# Patient Record
Sex: MALE | Race: BLACK OR AFRICAN AMERICAN | NOT HISPANIC OR LATINO | Employment: OTHER | URBAN - NONMETROPOLITAN AREA
[De-identification: names, ages, dates, MRNs, and addresses within clinical notes are randomized per-mention and may not be internally consistent; named-entity substitution may affect disease eponyms.]

---

## 2019-07-19 ENCOUNTER — TRANSCRIBE ORDERS (OUTPATIENT)
Dept: PHYSICAL THERAPY | Facility: HOSPITAL | Age: 64
End: 2019-07-19

## 2019-07-19 DIAGNOSIS — M19.011 OSTEOARTHRITIS OF RIGHT AC (ACROMIOCLAVICULAR) JOINT: ICD-10-CM

## 2019-07-19 DIAGNOSIS — M75.111 NONTRAUMATIC INCOMPLETE TEAR OF RIGHT ROTATOR CUFF: Primary | ICD-10-CM

## 2019-07-24 ENCOUNTER — HOSPITAL ENCOUNTER (OUTPATIENT)
Dept: PHYSICAL THERAPY | Facility: HOSPITAL | Age: 64
Setting detail: THERAPIES SERIES
Discharge: HOME | End: 2019-07-24

## 2019-07-24 DIAGNOSIS — M75.111 NONTRAUMATIC INCOMPLETE TEAR OF RIGHT ROTATOR CUFF: Primary | ICD-10-CM

## 2019-07-24 DIAGNOSIS — Z98.890 STATUS POST RIGHT ROTATOR CUFF REPAIR: ICD-10-CM

## 2019-07-24 PROCEDURE — 97161 PT EVAL LOW COMPLEX 20 MIN: CPT | Performed by: PHYSICAL THERAPIST

## 2019-07-24 PROCEDURE — 97110 THERAPEUTIC EXERCISES: CPT | Performed by: PHYSICAL THERAPIST

## 2019-07-24 NOTE — THERAPY EVALUATION
Outpatient Physical Therapy Ortho Initial Evaluation  [facility]     Patient Name: Jeramy South  : 1955  MRN: [id number]  Today's Date: 2019      Visit Date: 2019 ( medicare)  MD 19,   Recert 19    Subjective    There is no problem list on file for this patient.       No past medical history on file.  borderline HTN, diabetes,     No past surgical history on file. 3 lumbar surgeries, Previous R SAD and RTC repair, Dr Saleem,Left ankle fusion, Marco bunionectom  y, Thyroid surgery, bilateral CTR,     Medication: pt will bring list    ALLERGIES: NKDA  Visit Dx:     ICD-10-CM ICD-9-CM   1. Nontraumatic incomplete tear of right rotator cuff M75.111 726.13   2. Status post right rotator cuff repair Z98.890 V45.89       Objective    PT Ortho     Row Name 19 1500       Special Tests/Palpation    Special Tests/Palpation  -- Staples intact no signs of redness, inflammation in incision  -DD       General ROM    GENERAL ROM COMMENTS  Passive range of motion flexion 130 degrees, abduction 110 degrees, external rotation at 45 degrees abduction 30 degrees, internal rotation 50 degrees same position.  -DD       MMT (Manual Muscle Testing)    General MMT Comments  deferred post  op  -DD       Sensation    Sensation WNL?  WNL  -DD    Light Touch  No apparent deficits  -DD      User Key  (r) = Recorded By, (t) = Taken By, (c) = Cosigned By    Initials Name Provider Type    DD Meera Daniel, PT DPT Physical Therapist        Therapy Education  Given: HEP  Program: New  How Provided: Verbal, Written  Provided to: Patient  Level of Understanding: Verbalized, Demonstrated    Assessment/Plan     PT OP Goals     Row Name 19 1500          PT Short Term Goals    STG Date to Achieve  19  -DD     STG 1  Patient independent in home exercise program  -DD     STG 2   patient have passive shoulder flexion 165 degrees  -DD     STG 3  Patient have shoulder passive range of motion  abduction 160 degrees  -DD     STG 4  Patient have external rotation at 45 degrees abduction to 55 degrees or better  -DD     STG 5  Patient able to progress to active assistive program at 6 weeks  -DD     STG 6  Patient had a quick-score of 70% or less  -DD        Long Term Goals    LTG 1  Patient have a quick-score of less than 30%  -DD     LTG 2  Patient had manual muscle testing 5/5 shoulder flexion  -DD     LTG 3  Patient have shoulder abduction 4+ MMT  -DD     LTG 4  Patient have active range of motion 160 degrees flexion  -DD     LTG 5  Patient have active shoulder flexion 160 degrees  -DD     LTG 6  Patient have active internal rotation reach equal to the left  -DD     LTG 7  Patient have active external rotation at 90 degrees abduction to 80 degrees or better  -DD        Time Calculation    PT Goal Re-Cert Due Date  08/14/19  -DD       User Key  (r) = Recorded By, (t) = Taken By, (c) = Cosigned By    Initials Name Provider Type    DD Meera Daniel, PT DPT Physical Therapist          PT Assessment/Plan     Row Name 07/24/19 1506          PT Assessment    Functional Limitations  Limitation in home management;Limitations in community activities;Performance in leisure activities  -DD     Impairments  Muscle strength;Pain;Range of motion  -DD     Assessment Comments  Patient is 8 days postop rotator cuff repair on the right.  Will need guidance through protocol for progression of passive active assistive and strengthening phases return to prior level of function  -DD     Please refer to paper survey for additional self-reported information  Yes  -DD     Rehab Potential  Good  -DD     Patient/caregiver participated in establishment of treatment plan and goals  Yes  -DD     Patient would benefit from skilled therapy intervention  Yes  -DD        PT Plan    PT Frequency  2x/week  -DD     Predicted Duration of Therapy Intervention (Therapy Eval)  12 weeks  -DD     Planned CPT's?  PT THER PROC EA 15 MIN:  20027;PT EVAL LOW COMPLEXITY: 06292;PT ELECTRICAL STIM UNATTEND: ;PT HOT OR COLD PACK TREAT MCARE  -DD     Physical Therapy Interventions (Optional Details)  ROM (Range of Motion);home exercise program  -DD     PT Plan Comments  Passive range of motion only at this time.  MD note for patient 7/31 MD visit.  Will ask for protocol or guidance for progression.  May e-stim and ice as needed.  -DD       User Key  (r) = Recorded By, (t) = Taken By, (c) = Cosigned By    Initials Name Provider Type    Meera Abrams, PT DPT Physical Therapist            Exercises     Row Name 07/24/19 1500             Exercise 1    Exercise Name 1  Passive range of motion  -DD      Time 1  20  -DD         Exercise 2    Exercise Name 2  Codman pendulums  -DD      Reps 2  25 each  -DD         Exercise 3    Exercise Name 3  Scapular squeezes  -DD      Reps 3  20  -DD        User Key  (r) = Recorded By, (t) = Taken By, (c) = Cosigned By    Initials Name Provider Type    Meera Abrams, PT DPT Physical Therapist           Outcome Measure Options: Quick DASH  Quick DASH  Open a tight or new jar.: Unable  Do heavy household chores (e.g., wash walls, wash floors): Unable  Carry a shopping bag or briefcase: Unable  Wash your back: Unable  Use a knife to cut food: Unable  Recreational activities in which you take some force or impact through your arm, should or hand (e.g. golf, hammering, tennis, etc.): Unable  During the past week, to what extent has your arm, shoulder, or hand problem interfered with your normal social activites with family, friends, neighbors or groups?: Extremely  During the past week, were you limited in your work or other regular daily activities as a result of your arm, shoulder or hand problem?: Unable  Arm, Shoulder, or hand pain: Severe  Tingling (pins and needles) in your arm, shoulder, or hand: Severe  During the past week, how much difficulty have you had sleeping because of the pain in your  arm, shoulder or hand?: Severe Difficulty  Number of Questions Answered: 11  Quick DASH Score: 93.18         Time Calculation:     Start Time: 1345  Stop Time: 1425  Time Calculation (min): 40 min  Total Timed Code Minutes- PT: 25 minute(s)     Therapy Charges for Today     Code Description Service Date Service Provider Modifiers Qty    18136896356 HC PT EVAL LOW COMPLEXITY 1 7/24/2019 Meera Daniel, PT DPT GP 1    74349900364 HC PT THER PROC EA 15 MIN 7/24/2019 Meera Daniel PT DPT GP 2          PT G-Codes  Outcome Measure Options: Quick DASH  Quick DASH Score: 93.18         Meera Daniel PT VERITOT, [facility]  7/24/2019

## 2019-07-29 ENCOUNTER — HOSPITAL ENCOUNTER (OUTPATIENT)
Dept: PHYSICAL THERAPY | Facility: HOSPITAL | Age: 64
Setting detail: THERAPIES SERIES
Discharge: HOME | End: 2019-07-29

## 2019-07-29 DIAGNOSIS — Z98.890 STATUS POST RIGHT ROTATOR CUFF REPAIR: ICD-10-CM

## 2019-07-29 DIAGNOSIS — M75.111 NONTRAUMATIC INCOMPLETE TEAR OF RIGHT ROTATOR CUFF: Primary | ICD-10-CM

## 2019-07-29 PROCEDURE — 97110 THERAPEUTIC EXERCISES: CPT

## 2019-07-29 NOTE — THERAPY TREATMENT NOTE
Outpatient Physical Therapy Ortho Treatment Note  [facility]     Patient Name: Jeramy South  : 1955  MRN: [id number]  Today's Date: 2019      Visit Date: 2019     Subjective Improvement: 0%  Attendance:  2/2 (medicare)  Next MD Visit : 19  Recert Date:  19      Therapy Diagnosis:  S/P R RTC Repair 19        Visit Dx:    ICD-10-CM ICD-9-CM   1. Nontraumatic incomplete tear of right rotator cuff M75.111 726.13   2. Status post right rotator cuff repair Z98.890 V45.89       There is no problem list on file for this patient.       No past medical history on file.     No past surgical history on file.    PT Ortho     Row Name 19 1100       Precautions and Contraindications    Precautions  s/P R RTC Repair 19  -       Subjective Pain    Able to rate subjective pain?  yes  -    Pre-Treatment Pain Level  7  -       Posture/Observations    Posture/Observations Comments  abd pillow sling on R   -       General ROM    GENERAL ROM COMMENTS  PROM flexion 149; abd 141; IR 64; ER 30  -      User Key  (r) = Recorded By, (t) = Taken By, (c) = Cosigned By    Initials Name Provider Type    Dariana Cedeño PTA Physical Therapy Assistant                      PT Assessment/Plan     Row Name 19 1100          PT Assessment    Assessment Comments  MD note sent with pt and progressing well with PROM at this time. no change in HEP   -        PT Plan    PT Frequency  2x/week  -     Predicted Duration of Therapy Intervention (Therapy Eval)  12 weeks  -     PT Plan Comments  Continue with PROM at this time until advised by MD; Will progress around the 4 week anupama unless otherwise advised.   -       User Key  (r) = Recorded By, (t) = Taken By, (c) = Cosigned By    Initials Name Provider Type    Dariana Cedeño PTA Physical Therapy Assistant          Modalities     Row Name 19 1100             Subjective Pain    Post-Treatment Pain Level  5  -       Subjective Pain Comment  declines modlaties  -        User Key  (r) = Recorded By, (t) = Taken By, (c) = Cosigned By    Initials Name Provider Type    Dariana Cedeño PTA Physical Therapy Assistant        Exercises     Row Name 07/29/19 1100             Subjective Comments    Subjective Comments  Sees MD this week; Pain not to bad; doing well with exercises  -         Subjective Pain    Able to rate subjective pain?  yes  -      Pre-Treatment Pain Level  7  -      Post-Treatment Pain Level  5  -      Subjective Pain Comment  declines modlaties  -         Exercise 1    Exercise Name 1  pendulum 4 way   -      Reps 1  20 each   -KH         Exercise 2    Exercise Name 2  scap squeezes  -      Reps 2  20  -KH         Exercise 3    Exercise Name 3  shoulder shrugs  -      Reps 3  20  -KH         Exercise 4    Exercise Name 4  AROM elbow flex/ext  -      Cueing 4  Verbal  -      Sets 4  2  -KH      Reps 4  10  -KH         Exercise 5    Exercise Name 5  PROM all planes R shoulder  -      Time 5  15  -KH        User Key  (r) = Recorded By, (t) = Taken By, (c) = Cosigned By    Initials Name Provider Type    Dariana Cedeño PTA Physical Therapy Assistant                       PT OP Goals     Row Name 07/29/19 1100          PT Short Term Goals    STG Date to Achieve  08/14/19  -     STG 1  Patient independent in home exercise program  -     STG 1 Progress  Ongoing  -     STG 2   patient have passive shoulder flexion 165 degrees  -     STG 2 Progress  Progressing  -     STG 3  Patient have shoulder passive range of motion abduction 160 degrees  -     STG 3 Progress  Progressing  -     STG 4  Patient have external rotation at 45 degrees abduction to 55 degrees or better  -     STG 4 Progress  Progressing  -     STG 5  Patient able to progress to active assistive program at 6 weeks  -     STG 5 Progress  Progressing  -     STG 6  Patient had a quick-score of 70% or less  -         Long Term Goals    LTG 1  Patient have a quick-score of less than 30%  -     LTG 1 Progress  Progressing  -     LTG 2  Patient had manual muscle testing 5/5 shoulder flexion  -     LTG 2 Progress  Progressing  -     LTG 3  Patient have shoulder abduction 4+ MMT  -     LTG 3 Progress  Progressing  -     LTG 4  Patient have active range of motion 160 degrees flexion  -     LTG 4 Progress  Progressing  -     LTG 5  Patient have active shoulder flexion 160 degrees  -     LTG 5 Progress  Progressing  -     LTG 6  Patient have active internal rotation reach equal to the left  -     LTG 6 Progress  Progressing  -     LTG 7  Patient have active external rotation at 90 degrees abduction to 80 degrees or better  -     LTG 7 Progress  Progressing  -        Time Calculation    PT Goal Re-Cert Due Date  08/14/19  -       User Key  (r) = Recorded By, (t) = Taken By, (c) = Cosigned By    Initials Name Provider Type    Dariana Cedeño PTA Physical Therapy Assistant                         Time Calculation:   Start Time: 1100  Stop Time: 1125  Time Calculation (min): 25 min  Total Timed Code Minutes- PT: 25 minute(s)  Therapy Charges for Today     Code Description Service Date Service Provider Modifiers Qty    14583785482 HC PT THER PROC EA 15 MIN 7/29/2019 Dariana Shi PTA GP 2                    Dariana Shi PTA  7/29/2019

## 2019-08-01 ENCOUNTER — HOSPITAL ENCOUNTER (OUTPATIENT)
Dept: PHYSICAL THERAPY | Facility: HOSPITAL | Age: 64
Setting detail: THERAPIES SERIES
Discharge: HOME | End: 2019-08-01

## 2019-08-01 DIAGNOSIS — Z98.890 STATUS POST RIGHT ROTATOR CUFF REPAIR: ICD-10-CM

## 2019-08-01 DIAGNOSIS — M75.111 NONTRAUMATIC INCOMPLETE TEAR OF RIGHT ROTATOR CUFF: Primary | ICD-10-CM

## 2019-08-01 PROCEDURE — 97535 SELF CARE MNGMENT TRAINING: CPT

## 2019-08-01 PROCEDURE — 97110 THERAPEUTIC EXERCISES: CPT

## 2019-08-01 NOTE — THERAPY TREATMENT NOTE
Outpatient Physical Therapy Ortho Treatment Note  [facility]     Patient Name: Jeramy South  : 1955  MRN: [id number]  Today's Date: 2019      Visit Date: 2019     Subjective Improvement: 0%  Attendance:  3/3 (medicare)  Next MD Visit : 4 weeks  Recert Date:  19      Therapy Diagnosis:  S/p R RTC Repair 19        Visit Dx:    ICD-10-CM ICD-9-CM   1. Nontraumatic incomplete tear of right rotator cuff M75.111 726.13   2. Status post right rotator cuff repair Z98.890 V45.89       There is no problem list on file for this patient.       No past medical history on file.     No past surgical history on file.    PT Ortho     Row Name 19 1100       Precautions and Contraindications    Precautions  s/P R RTC Repair 19  -       Posture/Observations    Posture/Observations Comments  abd pillow sling on R   -      User Key  (r) = Recorded By, (t) = Taken By, (c) = Cosigned By    Initials Name Provider Type    Dariana Cedeño PTA Physical Therapy Assistant                      PT Assessment/Plan     Row Name 19 1100          PT Assessment    Assessment Comments  PT office with MD to be faxing protocol this date (called at 1115); continues to impvoed with ROM and doing well with HEP; add table walk aways to HEP  -        PT Plan    PT Frequency  2x/week  -BRISSA     Predicted Duration of Therapy Intervention (Therapy Eval)  12 weeks  -     PT Plan Comments  Continue with current POC; once protocol reviewed will follow guidlines  -       User Key  (r) = Recorded By, (t) = Taken By, (c) = Cosigned By    Initials Name Provider Type    Dariana Cedeño PTA Physical Therapy Assistant          Modalities     Row Name 19 1100             Ice    Patient reports will apply ice at home to involved area  Yes  -BRISSA        User Key  (r) = Recorded By, (t) = Taken By, (c) = Cosigned By    Initials Name Provider Type    Dariana Cedeño PTA Physical Therapy Assistant         Exercises     Row Name 08/01/19 1100             Subjective Comments    Subjective Comments  Pt reports that he goes back to MD in four week; DIdn't send anything with him but wants him to stay in the sling for at lease 2 more weeks  -         Subjective Pain    Able to rate subjective pain?  yes  -      Pre-Treatment Pain Level  7  -KH      Post-Treatment Pain Level  5  -KH         Exercise 1    Exercise Name 1  PROM to R shoulder   -      Time 1  10'  -KH         Exercise 2    Exercise Name 2  pendulums  -      Cueing 2  Verbal  -KH      Reps 2  20  -KH         Exercise 3    Exercise Name 3  scap squeezes  -      Cueing 3  Verbal  -KH      Sets 3  2  -KH      Reps 3  10  -KH         Exercise 4    Exercise Name 4  shoulder shurgs  -      Cueing 4  Verbal  -      Sets 4  2  -KH      Reps 4  10  -KH         Exercise 5    Exercise Name 5  AROM elbow flex/ext  -      Cueing 5  Verbal  -KH      Sets 5  2  -KH      Reps 5  10  -KH         Exercise 6    Exercise Name 6  table walk aways  -      Cueing 6  Verbal  -      Sets 6  1  -KH      Reps 6  10  -KH         Exercise 7    Exercise Name 7  Sling Re-adjustment and HEP review  -      Time 7  10'  -KH        User Key  (r) = Recorded By, (t) = Taken By, (c) = Cosigned By    Initials Name Provider Type    Dariana Cedeño, JOSE Physical Therapy Assistant                       PT OP Goals     Row Name 08/01/19 1100          PT Short Term Goals    STG Date to Achieve  08/14/19  -     STG 1  Patient independent in home exercise program  -     STG 1 Progress  Ongoing  -     STG 2   patient have passive shoulder flexion 165 degrees  -     STG 2 Progress  Progressing  -     STG 3  Patient have shoulder passive range of motion abduction 160 degrees  -     STG 3 Progress  Progressing  -     STG 4  Patient have external rotation at 45 degrees abduction to 55 degrees or better  -     STG 4 Progress  Progressing  -     STG 5  Patient able to  progress to active assistive program at 6 weeks  -     STG 5 Progress  Progressing  -     STG 6  Patient had a quick-score of 70% or less  -        Long Term Goals    LTG 1  Patient have a quick-score of less than 30%  -     LTG 1 Progress  Progressing  -     LTG 2  Patient had manual muscle testing 5/5 shoulder flexion  -     LTG 2 Progress  Progressing  -     LTG 3  Patient have shoulder abduction 4+ MMT  -     LTG 3 Progress  Progressing  -     LTG 4  Patient have active range of motion 160 degrees flexion  -     LTG 4 Progress  Progressing  -     LTG 5  Patient have active shoulder flexion 160 degrees  -     LTG 5 Progress  Progressing  -     LTG 6  Patient have active internal rotation reach equal to the left  -     LTG 6 Progress  Progressing  -     LTG 7  Patient have active external rotation at 90 degrees abduction to 80 degrees or better  -     LTG 7 Progress  Progressing  -        Time Calculation    PT Goal Re-Cert Due Date  08/14/19  -       User Key  (r) = Recorded By, (t) = Taken By, (c) = Cosigned By    Initials Name Provider Type    Dariana Cedeño PTA Physical Therapy Assistant                         Time Calculation:   Start Time: 1100  Stop Time: 1138  Time Calculation (min): 38 min  Total Timed Code Minutes- PT: 38 minute(s)  Therapy Charges for Today     Code Description Service Date Service Provider Modifiers Qty    55699511124 HC PT SELF CARE/MGMT/TRAIN EA 15 MIN 8/1/2019 Dariana Shi PTA GP 1    18157445425 HC PT THER PROC EA 15 MIN 8/1/2019 Dariana Shi PTA GP 2                    Dariana Shi PTA  8/1/2019

## 2019-08-05 ENCOUNTER — HOSPITAL ENCOUNTER (OUTPATIENT)
Dept: PHYSICAL THERAPY | Facility: HOSPITAL | Age: 64
Setting detail: THERAPIES SERIES
Discharge: HOME | End: 2019-08-05

## 2019-08-05 DIAGNOSIS — M75.111 NONTRAUMATIC INCOMPLETE TEAR OF RIGHT ROTATOR CUFF: Primary | ICD-10-CM

## 2019-08-05 PROCEDURE — 97110 THERAPEUTIC EXERCISES: CPT

## 2019-08-05 PROCEDURE — 97140 MANUAL THERAPY 1/> REGIONS: CPT

## 2019-08-05 NOTE — THERAPY TREATMENT NOTE
Outpatient Physical Therapy Ortho Treatment Note  [facility]     Patient Name: Jeramy South  : 1955  MRN: [id number]  Today's Date: 2019      Visit Date: 2019  Pt has attended 4/4 visits  MD 4 weeks  Shante 19  Visit Dx:    ICD-10-CM ICD-9-CM   1. Nontraumatic incomplete tear of right rotator cuff M75.111 726.13       There is no problem list on file for this patient.       No past medical history on file.     No past surgical history on file.                    PT Assessment/Plan     Row Name 19 1502          PT Assessment    Assessment Comments  Pt wearing sling and likes PROM feels his arm relaxes and feels better after PROM.  ROM is progressing within protocol guidelines and will continue with PROM x 4 weeks will be 3 weeks 19  -SP        PT Plan    PT Frequency  2x/week  -SP     Predicted Duration of Therapy Intervention (Therapy Eval)  12 weeks  -SP     PT Plan Comments  Continue as per protocol in chart 3 weeks 19  -SP       User Key  (r) = Recorded By, (t) = Taken By, (c) = Cosigned By    Initials Name Provider Type    Anastasiia Castro PTA Physical Therapy Assistant            Exercises     Row Name 19 1500 19 1100          Subjective Comments    Subjective Comments  --  Notes   -SP        Subjective Pain    Able to rate subjective pain?  --  yes  -SP     Pre-Treatment Pain Level  --  8  -SP     Post-Treatment Pain Level  7  -SP  --     Subjective Pain Comment  Pt will be 3 weeks post op 19  received fax from MD office with protocol   -SP  --        Exercise 1    Exercise Name 1  --  pendulums  -SP     Reps 1  --  20  -SP        Exercise 2    Exercise Name 2  --  shld shrugs  -SP     Reps 2  --  20  -SP        Exercise 3    Exercise Name 3  --  scap squeezes  -SP     Reps 3  --  20  -SP        Exercise 4    Exercise Name 4  --  table walk aways  -SP     Reps 4  --  10  -SP        Exercise 5    Exercise Name 5  PROM all planes with  ER relaxing only and ROM to elbow  -SP  --     Additional Comments  20 min  -SP  --        Exercise 6    Exercise Name 6  place and hold  -SP  --     Reps 6  3  -SP  --     Time 6  30 sec  -SP  --        Exercise 7    Exercise Name 7  declines ice will ice at home  -SP  --       User Key  (r) = Recorded By, (t) = Taken By, (c) = Cosigned By    Initials Name Provider Type    Anastasiia Castro, PTA Physical Therapy Assistant                       PT OP Goals     Row Name 08/05/19 1504 08/05/19 1100       PT Short Term Goals    STG Date to Achieve  --  08/14/19  -SP    STG 1  --  Patient independent in home exercise program  -SP    STG 1 Progress  --  Ongoing  -SP    STG 2  --   patient have passive shoulder flexion 165 degrees  -SP    STG 2 Progress  --  Progressing  -SP    STG 3  --  Patient have shoulder passive range of motion abduction 160 degrees  -SP    STG 3 Progress  --  Progressing  -SP    STG 4  --  Patient have external rotation at 45 degrees abduction to 55 degrees or better  -SP    STG 4 Progress  --  Progressing  -SP    STG 5  --  Patient able to progress to active assistive program at 6 weeks  -SP    STG 5 Progress  --  Progressing  -SP    STG 6  --  Patient had a quick-score of 70% or less  -SP       Long Term Goals    LTG 1  --  Patient have a quick-score of less than 30%  -SP    LTG 1 Progress  --  Progressing  -SP    LTG 2  --  Patient had manual muscle testing 5/5 shoulder flexion  -SP    LTG 2 Progress  --  Progressing  -SP    LTG 3  --  Patient have shoulder abduction 4+ MMT  -SP    LTG 3 Progress  --  Progressing  -SP    LTG 4  --  Patient have active range of motion 160 degrees flexion  -SP    LTG 4 Progress  --  Progressing  -SP    LTG 5  --  Patient have active shoulder flexion 160 degrees  -SP    LTG 5 Progress  --  Progressing  -SP    LTG 6  --  Patient have active internal rotation reach equal to the left  -SP    LTG 6 Progress  --  Progressing  -SP    LTG 7  --  Patient have active  external rotation at 90 degrees abduction to 80 degrees or better  -SP    LTG 7 Progress  --  Progressing  -SP       Time Calculation    PT Goal Re-Cert Due Date  08/14/19  -SP  08/14/19  -SP      User Key  (r) = Recorded By, (t) = Taken By, (c) = Cosigned By    Initials Name Provider Type    Anastasiia Castro PTA Physical Therapy Assistant                         Time Calculation:   Start Time: 1108  Stop Time: 1155  Time Calculation (min): 47 min  Total Timed Code Minutes- PT: 47 minute(s)  Therapy Charges for Today     Code Description Service Date Service Provider Modifiers Qty    88955906280 HC PT THER PROC EA 15 MIN 8/5/2019 Anastasiia Hoskins PTA GP 2    98990492236 HC PT MANUAL THERAPY EA 15 MIN 8/5/2019 Anastasiia Hoskins PTA GP 1                    Anastasiia Hoksins PTA  8/5/2019

## 2019-08-08 ENCOUNTER — HOSPITAL ENCOUNTER (OUTPATIENT)
Dept: PHYSICAL THERAPY | Facility: HOSPITAL | Age: 64
Setting detail: THERAPIES SERIES
Discharge: HOME | End: 2019-08-08

## 2019-08-08 DIAGNOSIS — M75.111 NONTRAUMATIC INCOMPLETE TEAR OF RIGHT ROTATOR CUFF: Primary | ICD-10-CM

## 2019-08-08 DIAGNOSIS — Z98.890 STATUS POST RIGHT ROTATOR CUFF REPAIR: ICD-10-CM

## 2019-08-08 PROCEDURE — 97110 THERAPEUTIC EXERCISES: CPT

## 2019-08-08 NOTE — THERAPY TREATMENT NOTE
"    Outpatient Physical Therapy Ortho Treatment Note  [facility]     Patient Name: Jeramy South  : 1955  MRN: [id number]  Today's Date: 2019      Visit Date: 2019      Subjective Improvement: \"some\"  Attendance:    Next MD Visit : 19??  Recert Date:  19      Therapy Diagnosis:  S/P R RTC Repair 19      Visit Dx:    ICD-10-CM ICD-9-CM   1. Nontraumatic incomplete tear of right rotator cuff M75.111 726.13   2. Status post right rotator cuff repair Z98.890 V45.89       There is no problem list on file for this patient.       No past medical history on file.     No past surgical history on file.    PT Ortho     Row Name 19 1100       Precautions and Contraindications    Precautions  s/P R RTC Repair 19  -BRISSA       Posture/Observations    Posture/Observations Comments  abd pillow sling R  -BRISSA      User Key  (r) = Recorded By, (t) = Taken By, (c) = Cosigned By    Initials Name Provider Type    Dariana Cedeño PTA Physical Therapy Assistant                      PT Assessment/Plan     Row Name 19 1100          PT Assessment    Assessment Comments  added AAROM with [email or web address] to HEP this date; good tolerance to new execises and seems to be progressing well with ROM at this time.   -        PT Plan    PT Frequency  2x/week  -BRISSA     Predicted Duration of Therapy Intervention (Therapy Eval)  12 weeks  -     PT Plan Comments  Add isometrics next visit. Continue with PROM and AAROM as protocol allows.   -BRISSA       User Key  (r) = Recorded By, (t) = Taken By, (c) = Cosigned By    Initials Name Provider Type    Dariana Cedeño PTA Physical Therapy Assistant          Modalities     Row Name 19 1100             Subjective Pain    Post-Treatment Pain Level  4  -BRISSA         Ice    Patient reports will apply ice at home to involved area  Yes  -BRISSA        User Key  (r) = Recorded By, (t) = Taken By, (c) = Cosigned By    Initials Name Provider Type    Dariana Cedeño PTA " "Physical Therapy Assistant        Exercises     Row Name 08/08/19 1100             Subjective Comments    Subjective Comments  pt reports that the sleeping is terrible and that's the reason his shoulder is hurting. Sling killing him. Took pain pills prior to PT  -KH         Subjective Pain    Able to rate subjective pain?  yes  -KH      Pre-Treatment Pain Level  7  -KH      Post-Treatment Pain Level  4  -KH         Exercise 1    Exercise Name 1  pendulums  -KH      Reps 1  20  -KH         Exercise 2    Exercise Name 2  shoulder shurgs  -KH      Cueing 2  Verbal  -KH      Reps 2  20  -KH         Exercise 3    Exercise Name 3  scap squeezes  -KH      Cueing 3  Verbal  -KH      Sets 3  2  -KH      Reps 3  10  -KH         Exercise 4    Exercise Name 4  AAROM wand chest press  -KH      Cueing 4  Verbal  -KH      Sets 4  2  -KH      Reps 4  10  -KH         Exercise 5    Exercise Name 5  AAROM wand flexion  -KH      Cueing 5  Verbal  -KH      Sets 5  2  -KH      Reps 5  10  -KH         Exercise 6    Exercise Name 6  AAROM wand abd   -KH      Cueing 6  Verbal  -KH      Sets 6  2  -KH      Reps 6  10  -KH         Exercise 7    Exercise Name 7  AAROM wand ER  -KH      Cueing 7  Verbal  -KH      Sets 7  1  -KH      Reps 7  10  -KH      Additional Comments  45 degress of abd  -KH         Exercise 8    Exercise Name 8  place and hold for rythmic stabilization  -KH      Cueing 8  Verbal  -KH      Sets 8  3  -KH      Time 8  30\"  -KH         Exercise 9    Exercise Name 9  PROM all planes  -KH      Time 9  10'  -KH        User Key  (r) = Recorded By, (t) = Taken By, (c) = Cosigned By    Initials Name Provider Type    Dariana Cedeño, JOSE Physical Therapy Assistant                       PT OP Goals     Row Name 08/08/19 1100          PT Short Term Goals    STG Date to Achieve  08/14/19  -     STG 1  Patient independent in home exercise program  -KH     STG 1 Progress  Ongoing  -     STG 2   patient have passive shoulder flexion " 165 degrees  -     STG 2 Progress  Progressing  -     STG 3  Patient have shoulder passive range of motion abduction 160 degrees  -     STG 3 Progress  Progressing  -     STG 4  Patient have external rotation at 45 degrees abduction to 55 degrees or better  -     STG 4 Progress  Progressing  -     STG 5  Patient able to progress to active assistive program at 6 weeks  -     STG 5 Progress  Progressing  -     STG 6  Patient had a quick-score of 70% or less  -        Long Term Goals    LTG 1  Patient have a quick-score of less than 30%  -     LTG 1 Progress  Progressing  -     LTG 2  Patient had manual muscle testing 5/5 shoulder flexion  -     LTG 2 Progress  Progressing  -     LTG 3  Patient have shoulder abduction 4+ MMT  -     LTG 3 Progress  Progressing  -     LTG 4  Patient have active range of motion 160 degrees flexion  -     LTG 4 Progress  Progressing  -     LTG 5  Patient have active shoulder flexion 160 degrees  -     LTG 5 Progress  Progressing  -     LTG 6  Patient have active internal rotation reach equal to the left  -     LTG 6 Progress  Progressing  -     LTG 7  Patient have active external rotation at 90 degrees abduction to 80 degrees or better  -     LTG 7 Progress  Progressing  -        Time Calculation    PT Goal Re-Cert Due Date  08/14/19  -       User Key  (r) = Recorded By, (t) = Taken By, (c) = Cosigned By    Initials Name Provider Type    Dariana Cedeño PTA Physical Therapy Assistant                         Time Calculation:   Start Time: 1105  Stop Time: 1145  Time Calculation (min): 40 min  Total Timed Code Minutes- PT: 40 minute(s)  Therapy Charges for Today     Code Description Service Date Service Provider Modifiers Qty    [id number]  PT THER PROC EA 15 MIN 8/8/2019 Dariana Shi PTA  3                    Dariana Shi PTA  8/8/2019

## 2019-08-12 ENCOUNTER — APPOINTMENT (OUTPATIENT)
Dept: PHYSICAL THERAPY | Facility: HOSPITAL | Age: 64
End: 2019-08-12

## 2019-08-14 ENCOUNTER — HOSPITAL ENCOUNTER (OUTPATIENT)
Dept: PHYSICAL THERAPY | Facility: HOSPITAL | Age: 64
Setting detail: THERAPIES SERIES
Discharge: HOME | End: 2019-08-14

## 2019-08-14 DIAGNOSIS — Z98.890 STATUS POST RIGHT ROTATOR CUFF REPAIR: ICD-10-CM

## 2019-08-14 DIAGNOSIS — M75.111 NONTRAUMATIC INCOMPLETE TEAR OF RIGHT ROTATOR CUFF: Primary | ICD-10-CM

## 2019-08-14 PROCEDURE — 97110 THERAPEUTIC EXERCISES: CPT | Performed by: PHYSICAL THERAPIST

## 2019-08-14 PROCEDURE — 97140 MANUAL THERAPY 1/> REGIONS: CPT | Performed by: PHYSICAL THERAPIST

## 2019-08-14 NOTE — THERAPY TREATMENT NOTE
Outpatient Physical Therapy Ortho PROGRESS NOTE  [facility]     Patient Name: Jeramy South  : 1955  MRN: [id number]  Today's Date: 2019      Visit Date: 2019      Subjective Improvement: 25% or more  Attendance:  6/7  Next MD Visit : 19 maybe  Recert Date: 19     Subjective  Pt states he is really not having much pain,  Demonstrates the way he is moving the arm AROM, He is warned to be cautious about active movment, until he progresses to  Those exercise activities.     Visit Dx:     ICD-10-CM ICD-9-CM   1. Nontraumatic incomplete tear of right rotator cuff M75.111 726.13   2. Status post right rotator cuff repair Z98.890 V45.89     Objective    PT Ortho     Row Name 19 1100       Precautions and Contraindications    Precautions  s/P R RTC Repair 19  -DD    Contraindications  Pt 4 weeks post op 19  -DD       Subjective Pain    Pre-Treatment Pain Level  2  -DD    Post-Treatment Pain Level  2  -DD    Subjective Pain Comment  some throbbing with movement  -DD       Posture/Observations    Posture/Observations Comments  sling without abd pillow  -DD       General ROM    GENERAL ROM COMMENTS  PROM shoulder flexion 170, abd 170, ER 90. IR 70  -DD       MMT (Manual Muscle Testing)    General MMT Comments  deferred  -DD      User Key  (r) = Recorded By, (t) = Taken By, (c) = Cosigned By    Initials Name Provider Type    DD Meera Daniel, PT DPT Physical Therapist        Therapy Education  Education Details: added isometrics to HEP  Given: HEP  Program: New  How Provided: Written, Verbal  Provided to: Patient  Level of Understanding: Verbalized, Demonstrated    Assessment/Plan     PT OP Goals     Row Name 19 1055          PT Short Term Goals    STG Date to Achieve  19  -DD     STG 1  Patient independent in home exercise program  -DD     STG 1 Progress  Ongoing;Progressing  -DD     STG 2   Patient have passive shoulder flexion 165 degrees  -DD     STG  2 Progress  Met  -DD     STG 3  Patient have shoulder passive range of motion abduction 160 degrees  -DD     STG 3 Progress  Met  -DD     STG 4  Patient have external rotation at 45 degrees abduction to 55 degrees or better  -DD     STG 4 Progress  Met  -DD     STG 5  Patient able to progress to active assistive program at 4 weeks  -DD     STG 5 Progress  Progressing;Ongoing  -DD     STG 6  Patient had a quick-score of 70% or less  -DD     STG 6 Progress  Met  -DD     STG 6 Progress Comments  25%  -DD        Long Term Goals    LTG 1  Patient have a quick-score of less than 30%  -DD     LTG 1 Progress  Met  -DD     LTG 1 Progress Comments  25%  -DD     LTG 2  Patient had manual muscle testing 5/5 shoulder flexion  -DD     LTG 2 Progress  Progressing  -DD     LTG 3  Patient have shoulder abduction 4+ MMT  -DD     LTG 3 Progress  Progressing  -DD     LTG 4  Patient have active range of motion 160 degrees flexion  -DD     LTG 4 Progress  Progressing  -DD     LTG 5  Patient have active shoulder flexion 160 degrees  -DD     LTG 5 Progress  Progressing  -DD     LTG 6  Patient have active internal rotation reach equal to the left  -DD     LTG 6 Progress  Progressing  -DD     LTG 7  Patient have active external rotation at 90 degrees abduction to 80 degrees or better  -DD     LTG 7 Progress  Progressing  -DD        Time Calculation    PT Goal Re-Cert Due Date  09/04/19  -DD       User Key  (r) = Recorded By, (t) = Taken By, (c) = Cosigned By    Initials Name Provider Type    Meera Abrams, PT DPT Physical Therapist          PT Assessment/Plan     Row Name 08/14/19 4944          PT Assessment    Assessment Comments  Pt is doing well, passive range of motion is full except internal rotation at 70 degrees but nonpainful and should reach normal with a few days of stretching.  He is on target to begin the active assistive protocol and progression of the 4 to 5-week task.  Would benefit from direction and progression  of the remaining protocol set forth by MD postoperatively.  -DD     Please refer to paper survey for additional self-reported information  Yes  -DD     Rehab Potential  Good  -DD     Patient/caregiver participated in establishment of treatment plan and goals  Yes  -DD     Patient would benefit from skilled therapy intervention  Yes  -DD        PT Plan    PT Frequency  2x/week  -DD     Predicted Duration of Therapy Intervention (Therapy Eval)  8 weeks  -DD     Planned CPT's?  PT EVAL LOW COMPLEXITY: 44638;PT THER PROC EA 15 MIN: 15379;PT MANUAL THERAPY EA 15 MIN: 02767;PT THER ACT EA 15 MIN: 47656;PT ELECTRICAL STIM UNATTEND: ;PT HOT OR COLD PACK TREAT MCARE;PT THER SUPP EA 15 MIN  -DD     Physical Therapy Interventions (Optional Details)  strengthening;stretching;ROM (Range of Motion);home exercise program;modalities  -DD     PT Plan Comments  Continue progression of protocol as tolerates, May use modalities for soreness or pain as needed.   -DD       User Key  (r) = Recorded By, (t) = Taken By, (c) = Cosigned By    Initials Name Provider Type    DD Meera Daniel, PT DPT Physical Therapist            Exercises     Row Name 08/14/19 1100             Subjective Comments    Subjective Comments  Shoulder is throbbing  -DD         Subjective Pain    Able to rate subjective pain?  yes  -DD      Pre-Treatment Pain Level  2  -DD      Post-Treatment Pain Level  2  -DD      Subjective Pain Comment  some throbbing with movement  -DD         Exercise 1    Exercise Name 1  Pulleys  -DD      Reps 1  20 each  -DD      Additional Comments  flexion/abd  -DD         Exercise 2    Exercise Name 2  wand flexion  -DD      Reps 2  20  -DD         Exercise 3    Exercise Name 3  wand abd  -DD      Reps 3  20  -DD         Exercise 4    Exercise Name 4  isometrics 6 way  -DD      Reps 4  20 each  -DD         Exercise 5    Exercise Name 5  prone row   -DD      Reps 5  20 ea  -DD      Additional Comments  to neutral per protocol   -DD         Exercise 6    Exercise Name 6  DB bicep curl  -DD      Reps 6  30  -DD      Additional Comments  3#  -DD         Exercise 7    Exercise Name 7  prone ext to neutral  -DD      Reps 7  20  -DD         Exercise 8    Exercise Name 8  PROM  -DD      Time 8  10'  -DD        User Key  (r) = Recorded By, (t) = Taken By, (c) = Cosigned By    Initials Name Provider Type    DD Meera Daniel, PT DPT Physical Therapist           Outcome Measure Options: Quick DASH  Quick DASH  Open a tight or new jar.: Mild Difficulty  Do heavy household chores (e.g., wash walls, wash floors): Unable  Carry a shopping bag or briefcase: Moderate Difficulty  Wash your back: No Difficulty  Use a knife to cut food: No Difficulty  Recreational activities in which you take some force or impact through your arm, should or hand (e.g. golf, hammering, tennis, etc.): Mild Difficulty  During the past week, to what extent has your arm, shoulder, or hand problem interfered with your normal social activites with family, friends, neighbors or groups?: Slightly  During the past week, were you limited in your work or other regular daily activities as a result of your arm, shoulder or hand problem?: Slightly Limited  Arm, Shoulder, or hand pain: Mild  Tingling (pins and needles) in your arm, shoulder, or hand: None  During the past week, how much difficulty have you had sleeping because of the pain in your arm, shoulder or hand?: No difficulty  Number of Questions Answered: 11  Quick DASH Score: 25     Time Calculation:     Start Time: 1053  Stop Time: 1148  Time Calculation (min): 55 min  Total Timed Code Minutes- PT: 55 minute(s)     Therapy Charges for Today     Code Description Service Date Service Provider Modifiers Qty    82135649273 HC PT THER PROC EA 15 MIN 8/14/2019 Meera Daniel, PT DPT GP 3    90655268187 HC PT MANUAL THERAPY EA 15 MIN 8/14/2019 Meera Daniel, PT DPT GP 1          PT G-Codes  Outcome Measure  Options: Quick DASH  Quick DASH Score: 25         Meera Daniel, PT DPT, ATC  8/14/2019

## 2019-08-20 ENCOUNTER — HOSPITAL ENCOUNTER (OUTPATIENT)
Dept: PHYSICAL THERAPY | Facility: HOSPITAL | Age: 64
Setting detail: THERAPIES SERIES
Discharge: HOME | End: 2019-08-20

## 2019-08-20 DIAGNOSIS — Z98.890 STATUS POST RIGHT ROTATOR CUFF REPAIR: ICD-10-CM

## 2019-08-20 DIAGNOSIS — M75.111 NONTRAUMATIC INCOMPLETE TEAR OF RIGHT ROTATOR CUFF: Primary | ICD-10-CM

## 2019-08-20 PROCEDURE — 97110 THERAPEUTIC EXERCISES: CPT

## 2019-08-20 NOTE — THERAPY TREATMENT NOTE
Outpatient Physical Therapy Ortho Treatment Note  [facility]     Patient Name: Jeramy South  : 1955  MRN: [id number]  Today's Date: 2019      Visit Date: 2019     Subjective Improvement: 25%  Attendance:   (medicare)  Next MD Visit : 19  Recert Date:  19      Therapy Diagnosis:  S/P R RTC Repair 19        Visit Dx:    ICD-10-CM ICD-9-CM   1. Nontraumatic incomplete tear of right rotator cuff M75.111 726.13   2. Status post right rotator cuff repair Z98.890 V45.89       There is no problem list on file for this patient.       No past medical history on file.     No past surgical history on file.    PT Ortho     Row Name 19 1045       Precautions and Contraindications    Precautions/Limitations  fall precautions Watch pain on L ankle (ORIF )   -    Precautions  s/P R RTC Repair 19  -BRISSA    Contraindications  5 weeks post-op this date  -       Subjective Pain    Post-Treatment Pain Level  2  -       Posture/Observations    Posture/Observations Comments  sling without abd pillow  -      User Key  (r) = Recorded By, (t) = Taken By, (c) = Cosigned By    Initials Name Provider Type    Dariana Cedeño PTA Physical Therapy Assistant                      PT Assessment/Plan     Row Name 19 4453          PT Assessment    Assessment Comments  did well with new exercises rendered this date; unable to do standing exercises secondary to pain in the left ankle this date. no change in HEP  -        PT Plan    PT Frequency  2x/week  -     Predicted Duration of Therapy Intervention (Therapy Eval)  8 weeks  -     PT Plan Comments  PROM next; IR stretch next; Add Tband rows next; Montior standing exercises secondary to L ankle.   -       User Key  (r) = Recorded By, (t) = Taken By, (c) = Cosigned By    Initials Name Provider Type    Dariana Cedeño PTA Physical Therapy Assistant            Exercises     Row Name 19 0945             Subjective  "Comments    Subjective Comments  Pt reports that his ankle is hurting; states that his shoulder is feeling good;   -         Subjective Pain    Able to rate subjective pain?  yes  -      Pre-Treatment Pain Level  3  -KH      Post-Treatment Pain Level  2  -KH         Exercise 1    Exercise Name 1  Pulleys flexion; abd  -KH      Reps 1  20 each  -KH         Exercise 2    Exercise Name 2  isometric 6 way   -KH      Cueing 2  Verbal  -KH      Sets 2  1  -KH      Reps 2  10  -KH      Time 2  5\" holds  -KH         Exercise 3    Exercise Name 3  seated wand flexion/abd  -KH      Cueing 3  Verbal  -KH      Sets 3  2  -KH      Reps 3  10  -KH         Exercise 4    Exercise Name 4  prone rows  -KH      Cueing 4  Verbal  -KH      Sets 4  2  -KH      Reps 4  10  -KH         Exercise 5    Exercise Name 5  prone shoulder ext   -KH      Reps 5  20  -KH         Exercise 6    Exercise Name 6  IR/ER w/ tband Neurtral  -KH      Cueing 6  Verbal  -KH      Sets 6  2  -KH      Reps 6  10  -KH      Additional Comments  yellow  -        User Key  (r) = Recorded By, (t) = Taken By, (c) = Cosigned By    Initials Name Provider Type    Dariana Cedeño PTA Physical Therapy Assistant                       PT OP Goals     Row Name 08/20/19 1045          PT Short Term Goals    STG Date to Achieve  08/14/19  -     STG 1  Patient independent in home exercise program  -     STG 1 Progress  Ongoing;Progressing  -     STG 2   Patient have passive shoulder flexion 165 degrees  -     STG 2 Progress  Met  -     STG 3  Patient have shoulder passive range of motion abduction 160 degrees  -     STG 3 Progress  Met  -     STG 4  Patient have external rotation at 45 degrees abduction to 55 degrees or better  -     STG 4 Progress  Met  -     STG 5  Patient able to progress to active assistive program at 4 weeks  -     STG 5 Progress  Met  -     STG 6  Patient had a quick-score of 70% or less  -     STG 6 Progress  Met  -     STG " 6 Progress Comments  25%  -        Long Term Goals    LTG 1  Patient have a quick-score of less than 30%  -     LTG 1 Progress  Met  -     LTG 2  Patient had manual muscle testing 5/5 shoulder flexion  -     LTG 2 Progress  Progressing  -     LTG 3  Patient have shoulder abduction 4+ MMT  -     LTG 3 Progress  Progressing  -     LTG 4  Patient have active range of motion 160 degrees flexion  -     LTG 4 Progress  Progressing  -     LTG 5  Patient have active shoulder flexion 160 degrees  -     LTG 5 Progress  Progressing  -     LTG 6  Patient have active internal rotation reach equal to the left  -     LTG 6 Progress  Progressing  -     LTG 7  Patient have active external rotation at 90 degrees abduction to 80 degrees or better  -     LTG 7 Progress  Progressing  -        Time Calculation    PT Goal Re-Cert Due Date  09/04/19  -       User Key  (r) = Recorded By, (t) = Taken By, (c) = Cosigned By    Initials Name Provider Type     Dariana Shi PTA Physical Therapy Assistant                         Time Calculation:   Start Time: 1045  Stop Time: 1134  Time Calculation (min): 49 min  Total Timed Code Minutes- PT: 49 minute(s)  Therapy Charges for Today     Code Description Service Date Service Provider Modifiers Qty    [id number] HC PT THER PROC EA 15 MIN 8/20/2019 Dariana Shi PTA GP 3                    Dariana Shi PTA  8/20/2019

## 2019-08-22 ENCOUNTER — HOSPITAL ENCOUNTER (OUTPATIENT)
Dept: PHYSICAL THERAPY | Facility: HOSPITAL | Age: 64
Setting detail: THERAPIES SERIES
Discharge: HOME | End: 2019-08-22

## 2019-08-22 DIAGNOSIS — M75.111 NONTRAUMATIC INCOMPLETE TEAR OF RIGHT ROTATOR CUFF: Primary | ICD-10-CM

## 2019-08-22 DIAGNOSIS — Z98.890 STATUS POST RIGHT ROTATOR CUFF REPAIR: ICD-10-CM

## 2019-08-22 PROCEDURE — 97110 THERAPEUTIC EXERCISES: CPT

## 2019-08-26 ENCOUNTER — HOSPITAL ENCOUNTER (OUTPATIENT)
Dept: PHYSICAL THERAPY | Facility: HOSPITAL | Age: 64
Setting detail: THERAPIES SERIES
Discharge: HOME | End: 2019-08-26

## 2019-08-26 DIAGNOSIS — Z98.890 STATUS POST RIGHT ROTATOR CUFF REPAIR: ICD-10-CM

## 2019-08-26 DIAGNOSIS — M75.111 NONTRAUMATIC INCOMPLETE TEAR OF RIGHT ROTATOR CUFF: Primary | ICD-10-CM

## 2019-08-26 PROCEDURE — 97110 THERAPEUTIC EXERCISES: CPT

## 2019-08-26 NOTE — THERAPY TREATMENT NOTE
Outpatient Physical Therapy Ortho Treatment Note  [facility]     Patient Name: Jeramy South  : 1955  MRN: [id number]  Today's Date: 2019      Visit Date: 2019     Subjective Improvement: 25%  Attendance:  9/10 (medicare)  Next MD Visit : 19  Recert Date:  19      Therapy Diagnosis:  S/P R RTC Repair 19        Visit Dx:    ICD-10-CM ICD-9-CM   1. Nontraumatic incomplete tear of right rotator cuff M75.111 726.13   2. Status post right rotator cuff repair Z98.890 V45.89       There is no problem list on file for this patient.       No past medical history on file.     No past surgical history on file.    PT Ortho     Row Name 19       Precautions and Contraindications    Precautions/Limitations  fall precautions  -    Precautions  s/P R RTC Repair 19 L ankle ORIF   -    Contraindications  6 weeks post-op  -       Posture/Observations    Posture/Observations Comments  No sling this date  -      User Key  (r) = Recorded By, (t) = Taken By, (c) = Cosigned By    Initials Name Provider Type    Dariana Cedeño PTA Physical Therapy Assistant                      PT Assessment/Plan     Row Name 19          PT Assessment    Assessment Comments  progressing well with strength and ROM in the right shoulder; education of do and don'ts with R shoulder this date; pt verbalized understanding. MD note to be faxed.   -        PT Plan    PT Frequency  2x/week  -BRISSA     Predicted Duration of Therapy Intervention (Therapy Eval)  8 weeks  -     PT Plan Comments  Continue to progress as MD allows and ROM as pt tolerates.   -       User Key  (r) = Recorded By, (t) = Taken By, (c) = Cosigned By    Initials Name Provider Type    Dariana Cedeño PTA Physical Therapy Assistant          Modalities     Row Name 1931             Subjective Pain    Post-Treatment Pain Level  2  -         Ice    Patient reports will apply ice at home to involved  area  Yes  -KH        User Key  (r) = Recorded By, (t) = Taken By, (c) = Cosigned By    Initials Name Provider Type    Dariana Cedeño PTA Physical Therapy Assistant        Exercises     Row Name 08/26/19 0931             Subjective Comments    Subjective Comments  Pt states that he sees the MD this week; Progressing well not having any issues; Not wearing sling  -KH         Subjective Pain    Able to rate subjective pain?  yes  -KH      Pre-Treatment Pain Level  2  -KH      Post-Treatment Pain Level  2  -KH         Exercise 1    Exercise Name 1  pro ll UE strength   -KH      Cueing 1  Verbal  -KH      Time 1  10'  -KH      Additional Comments  level 2; seat 10  -KH         Exercise 2    Exercise Name 2  pulley's flexion/abd  -KH      Cueing 2  Verbal  -KH      Sets 2  3  -KH      Reps 2  10  -KH         Exercise 3    Exercise Name 3  supine wand chest press   -KH      Cueing 3  Verbal  -KH      Sets 3  2  -KH      Reps 3  10  -KH      Additional Comments  2# wand  -KH         Exercise 4    Exercise Name 4  supine wand flexion  -KH      Cueing 4  Verbal  -KH      Sets 4  2  -KH      Reps 4  10  -KH      Additional Comments  2# wand  -KH         Exercise 5    Exercise Name 5  supine serratus punches  -KH      Cueing 5  Verbal  -KH      Sets 5  2  -KH      Reps 5  10  -KH      Additional Comments  1# db  -KH         Exercise 6    Exercise Name 6  supine serratus circles cw/ccw  -KH      Cueing 6  Verbal  -KH      Sets 6  2  -KH      Reps 6  10  -KH      Additional Comments  1# db  -KH         Exercise 7    Exercise Name 7  supine AROM flexion  -KH      Cueing 7  Verbal  -KH      Sets 7  2  -KH      Reps 7  10  -KH         Exercise 8    Exercise Name 8  SL ER & Scaption  -KH      Cueing 8  Verbal  -KH      Sets 8  1  -KH      Reps 8  10  -KH         Exercise 9    Exercise Name 9  prone Rows  -KH      Cueing 9  Verbal  -KH      Sets 9  2  -KH      Reps 9  10  -KH      Additional Comments  1# db  -KH         Exercise 10     Exercise Name 10  prone horz abd  -      Cueing 10  Verbal  -      Sets 10  2  -KH      Reps 10  10  -KH        User Key  (r) = Recorded By, (t) = Taken By, (c) = Cosigned By    Initials Name Provider Type    Dariana Cedeño, PTA Physical Therapy Assistant                       PT OP Goals     Row Name 08/26/19 0931          PT Short Term Goals    STG Date to Achieve  08/14/19  -     STG 1  Patient independent in home exercise program  -     STG 1 Progress  Ongoing;Progressing  -     STG 2   Patient have passive shoulder flexion 165 degrees  -     STG 2 Progress  Met  -     STG 3  Patient have shoulder passive range of motion abduction 160 degrees  -     STG 3 Progress  Met  -     STG 4  Patient have external rotation at 45 degrees abduction to 55 degrees or better  -     STG 4 Progress  Met  -     STG 5  Patient able to progress to active assistive program at 4 weeks  -     STG 5 Progress  Met  -     STG 6  Patient had a quick-score of 70% or less  -     STG 6 Progress  Met  -     STG 6 Progress Comments  25%  -        Long Term Goals    LTG 1  Patient have a quick-score of less than 30%  -     LTG 1 Progress  Met  -     LTG 2  Patient had manual muscle testing 5/5 shoulder flexion  -     LTG 2 Progress  Progressing  -     LTG 3  Patient have shoulder abduction 4+ MMT  -     LTG 3 Progress  Progressing  -     LTG 4  Patient have active range of motion 160 degrees flexion  -     LTG 4 Progress  Progressing  -     LTG 5  Patient have active shoulder flexion 160 degrees  -     LTG 5 Progress  Progressing  -     LTG 6  Patient have active internal rotation reach equal to the left  -     LTG 6 Progress  Progressing  -     LTG 7  Patient have active external rotation at 90 degrees abduction to 80 degrees or better  -     LTG 7 Progress  Progressing  -        Time Calculation    PT Goal Re-Cert Due Date  09/04/19  -       User Key  (r) = Recorded By, (t) =  Taken By, (c) = Cosigned By    Initials Name Provider Type     Dariana Shi PTA Physical Therapy Assistant                         Time Calculation:   Start Time: 0931  Stop Time: 1015  Time Calculation (min): 44 min  Total Timed Code Minutes- PT: 44 minute(s)  Therapy Charges for Today     Code Description Service Date Service Provider Modifiers Qty    54118339433 HC PT THER PROC EA 15 MIN 8/26/2019 Dariana Shi PTA GP 3                    Dariana Shi PTA  8/26/2019

## 2019-08-30 ENCOUNTER — HOSPITAL ENCOUNTER (OUTPATIENT)
Dept: PHYSICAL THERAPY | Facility: HOSPITAL | Age: 64
Setting detail: THERAPIES SERIES
Discharge: HOME | End: 2019-08-30

## 2019-08-30 DIAGNOSIS — Z98.890 STATUS POST RIGHT ROTATOR CUFF REPAIR: ICD-10-CM

## 2019-08-30 DIAGNOSIS — M75.111 NONTRAUMATIC INCOMPLETE TEAR OF RIGHT ROTATOR CUFF: Primary | ICD-10-CM

## 2019-08-30 PROCEDURE — 97110 THERAPEUTIC EXERCISES: CPT

## 2019-08-30 NOTE — THERAPY TREATMENT NOTE
Outpatient Physical Therapy Ortho Treatment Note  [facility]     Patient Name: Jeramy South  : 1955  MRN: [id number]  Today's Date: 2019      Visit Date: 2019     Subjective Improvement: 25%  Attendance:  10/11 (medicare)  Next MD Visit : 4 weeks?  Recert Date:  19        Therapy Diagnosis:  S/P R RTC Repair 19    Visit Dx:    ICD-10-CM ICD-9-CM   1. Nontraumatic incomplete tear of right rotator cuff M75.111 726.13   2. Status post right rotator cuff repair Z98.890 V45.89       There is no problem list on file for this patient.       No past medical history on file.     No past surgical history on file.    PT Ortho     Row Name 19 0745       Precautions and Contraindications    Precautions/Limitations  fall precautions  -    Precautions  s/P R RTC Repair 19 L ankle ORIF   -       Posture/Observations    Posture/Observations Comments  No sling; fwd flexed at trunk   -       General ROM    GENERAL ROM COMMENTS  AROM standing Flexion 152; Abd 130; ER at side 77  -      User Key  (r) = Recorded By, (t) = Taken By, (c) = Cosigned By    Initials Name Provider Type    Dariana Cedeño PTA Physical Therapy Assistant                      PT Assessment/Plan     Row Name 19 0745          PT Assessment    Assessment Comments  continues to progress with strengthening and AROM of the Right shoulder   -        PT Plan    PT Frequency  2x/week  -     Predicted Duration of Therapy Intervention (Therapy Eval)  8 weeks  -     PT Plan Comments  Contineu to progress as pt tolerates per protocol. Will be 7 weeks at next visit.   -       User Key  (r) = Recorded By, (t) = Taken By, (c) = Cosigned By    Initials Name Provider Type    Dariana Cedeño PTA Physical Therapy Assistant          Modalities     Row Name 19 0745             Ice    Patient reports will apply ice at home to involved area  Yes  -        User Key  (r) = Recorded By, (t) = Taken By,  (c) = Cosigned By    Initials Name Provider Type    Dariana Cedeño PTA Physical Therapy Assistant        Exercises     Row Name 08/30/19 0745             Subjective Comments    Subjective Comments  Pt reports that the MD appt went well; states that he wants 4 more weeks of PT and that he is still not supposed to lift anything heavy  -KH         Subjective Pain    Able to rate subjective pain?  yes  -KH      Pre-Treatment Pain Level  2  -KH      Post-Treatment Pain Level  2  -KH         Exercise 1    Exercise Name 1  pro ll UE strength   -KH      Cueing 1  Verbal  -KH      Time 1  10'  -KH      Additional Comments  level 2; seat 10  -KH         Exercise 2    Exercise Name 2  pulley's flexion/abd  -KH      Cueing 2  Verbal  -KH      Sets 2  2  -KH      Reps 2  10  -KH         Exercise 3    Exercise Name 3  supine AROM shoulder flexion  -KH      Cueing 3  Verbal  -KH      Sets 3  2  -KH      Reps 3  10  -KH      Additional Comments  1# db  -KH         Exercise 4    Exercise Name 4  SL ER  -KH      Cueing 4  Verbal  -KH      Sets 4  2  -KH      Reps 4  10  -KH      Additional Comments  1# db  -KH         Exercise 5    Exercise Name 5  SL SHoulder Abd  -KH      Cueing 5  Verbal  -KH      Sets 5  2  -KH      Reps 5  10  -KH      Additional Comments  1# db  -KH         Exercise 6    Exercise Name 6  Supine Serratus Punches  -KH      Cueing 6  Verbal  -KH      Sets 6  2  -KH      Reps 6  10  -KH      Additional Comments  1# db  -KH         Exercise 7    Exercise Name 7  supine serratus circles cw/ccw  -KH      Cueing 7  Verbal  -KH      Sets 7  2  -KH      Reps 7  10  -KH      Additional Comments  1# db  -KH         Exercise 8    Exercise Name 8  CC High/Mid Rows  -KH      Cueing 8  Verbal  -KH      Sets 8  2  -KH      Reps 8  10  -KH      Additional Comments  3 plates  -KH         Exercise 9    Exercise Name 9  bicep curls  -KH      Cueing 9  Verbal  -KH      Sets 9  3  -KH      Reps 9  10  -KH      Additional Comments   3# db  -KH         Exercise 10    Exercise Name 10  overhead press with [place]  -      Cueing 10  Verbal  -      Sets 10  2  -KH      Reps 10  10  -      Additional Comments  2# wand  -        User Key  (r) = Recorded By, (t) = Taken By, (c) = Cosigned By    Initials Name Provider Type    Dariana Cedeño PTA Physical Therapy Assistant                       PT OP Goals     Row Name 08/30/19 0745          PT Short Term Goals    STG Date to Achieve  08/14/19  -     STG 1  Patient independent in home exercise program  -     STG 1 Progress  Ongoing;Progressing  -     STG 2   Patient have passive shoulder flexion 165 degrees  -     STG 2 Progress  Met  -     STG 3  Patient have shoulder passive range of motion abduction 160 degrees  -     STG 3 Progress  Met  -     STG 4  Patient have external rotation at 45 degrees abduction to 55 degrees or better  -     STG 4 Progress  Met  -     STG 5  Patient able to progress to active assistive program at 4 weeks  -     STG 5 Progress  Met  -     STG 6  Patient had a quick-score of 70% or less  -     STG 6 Progress  Met  -     STG 6 Progress Comments  25%  -        Long Term Goals    LTG 1  Patient have a quick-score of less than 30%  -     LTG 1 Progress  Met  -     LTG 2  Patient had manual muscle testing 5/5 shoulder flexion  -     LTG 2 Progress  Progressing  -     LTG 3  Patient have shoulder abduction 4+ MMT  -     LTG 3 Progress  Progressing  -     LTG 4  Patient have active range of motion 160 degrees flexion  -     LTG 4 Progress  Progressing  -     LTG 5  Patient have active shoulder flexion 160 degrees  -     LTG 5 Progress  Progressing  -     LTG 6  Patient have active internal rotation reach equal to the left  -     LTG 6 Progress  Progressing  -     LTG 7  Patient have active external rotation at 90 degrees abduction to 80 degrees or better  -     LTG 7 Progress  Progressing  -        Time Calculation    PT  Goal Re-Cert Due Date  09/04/19  -BRISSA       User Key  (r) = Recorded By, (t) = Taken By, (c) = Cosigned By    Initials Name Provider Type    Dariana Cedeño PTA Physical Therapy Assistant                         Time Calculation:   Start Time: 0745  Stop Time: 0830  Time Calculation (min): 45 min  Total Timed Code Minutes- PT: 45 minute(s)  Therapy Charges for Today     Code Description Service Date Service Provider Modifiers Qty    06685718110 HC PT THER PROC EA 15 MIN 8/30/2019 Dariana Shi PTA GP 3                    Dariana Shi PTA  8/30/2019

## 2019-09-04 ENCOUNTER — HOSPITAL ENCOUNTER (OUTPATIENT)
Dept: PHYSICAL THERAPY | Facility: HOSPITAL | Age: 64
Setting detail: THERAPIES SERIES
Discharge: HOME | End: 2019-09-04

## 2019-09-04 DIAGNOSIS — M75.111 NONTRAUMATIC INCOMPLETE TEAR OF RIGHT ROTATOR CUFF: Primary | ICD-10-CM

## 2019-09-04 DIAGNOSIS — Z98.890 STATUS POST RIGHT ROTATOR CUFF REPAIR: ICD-10-CM

## 2019-09-04 PROCEDURE — 97110 THERAPEUTIC EXERCISES: CPT | Performed by: PHYSICAL THERAPIST

## 2019-09-04 NOTE — THERAPY PROGRESS REPORT/RE-CERT
Outpatient Physical Therapy Ortho Progress Note  [facility]     Patient Name: Jeramy South  : 1955  MRN: [id number]  Today's Date: 2019      Visit Date: 2019  Subjective Improvement: 25%  Attendance:   (medicare)  Next MD Visit : TBD  Recert Date:  19  There is no problem list on file for this patient.       No past medical history on file.     No past surgical history on file.    Visit Dx:     ICD-10-CM ICD-9-CM   1. Nontraumatic incomplete tear of right rotator cuff M75.111 726.13   2. Status post right rotator cuff repair Z98.890 V45.89             PT Ortho     Row Name 19 0800       Subjective Comments    Subjective Comments  pt reports 10-20% improvement.  -BS       Precautions and Contraindications    Precautions/Limitations  fall precautions  -BS    Precautions  s/P R RTC Repair 19  -BS    Contraindications  7 wks, 1 day post-op  -BS       Subjective Pain    Able to rate subjective pain?  yes  -BS    Pre-Treatment Pain Level  3  -BS    Post-Treatment Pain Level  3  -BS       Posture/Observations    Posture/Observations Comments  No sling; fwd flexed at trunk   -BS       General ROM    GENERAL ROM COMMENTS  AROM (supine): R shoulder flex 147 deg, abd 125 deg ER 72 deg IR 65 deg, L shoulder IR 67 deg   -BS       MMT (Manual Muscle Testing)    General MMT Comments  MMT: R shoulder-flex 3+/5 abd 3+/5 ER 4+/5 IR 5/5  -BS      User Key  (r) = Recorded By, (t) = Taken By, (c) = Cosigned By    Initials Name Provider Type    Ashvin Alarcon, PT Physical Therapist                      Therapy Education  Education Details: HEP: added wall climbs  Given: HEP  Program: New, Reinforced  How Provided: Verbal, Demonstration  Provided to: Patient  Level of Understanding: Verbalized, Demonstrated     PT OP Goals     Row Name 19 0805          PT Short Term Goals    STG Date to Achieve  19  -BS     STG 1  Patient independent in home exercise program  -BS     STG  1 Progress  Ongoing;Progressing  -BS     STG 2   Patient have passive shoulder flexion 165 degrees  -BS     STG 2 Progress  Met  -BS     STG 3  Patient have shoulder passive range of motion abduction 160 degrees  -BS     STG 3 Progress  Met  -BS     STG 4  Patient have external rotation at 45 degrees abduction to 55 degrees or better  -BS     STG 4 Progress  Met  -BS     STG 5  Patient able to progress to active assistive program at 4 weeks  -BS     STG 5 Progress  Met  -BS     STG 6  Patient had a quick-score of 70% or less  -BS     STG 6 Progress  Met  -BS     STG 6 Progress Comments  25%  -BS        Long Term Goals    LTG 1  Patient have a quick-score of less than 30%  -BS     LTG 1 Progress  Met  -BS     LTG 2  Patient had manual muscle testing 5/5 shoulder flexion  -BS     LTG 2 Progress  Progressing  -BS     LTG 3  Patient have shoulder abduction 4+ MMT  -BS     LTG 3 Progress  Progressing  -BS     LTG 4  Patient have active range of motion 160 degrees flexion  -BS     LTG 4 Progress  Progressing  -BS     LTG 5  Patient have active shoulder flexion 160 degrees  -BS     LTG 5 Progress  Progressing  -BS     LTG 6  Patient have active internal rotation reach equal to the left  -BS     LTG 6 Progress  Progressing  -BS     LTG 7  Patient have active external rotation at 90 degrees abduction to 80 degrees or better  -BS     LTG 7 Progress  Progressing  -BS        Time Calculation    PT Goal Re-Cert Due Date  09/25/19  -BS       User Key  (r) = Recorded By, (t) = Taken By, (c) = Cosigned By    Initials Name Provider Type    Ashvin Alarcon, PT Physical Therapist          PT Assessment/Plan     Row Name 09/04/19 0805          PT Assessment    Functional Limitations  Limitation in home management;Limitations in community activities;Performance in leisure activities  -BS     Impairments  Muscle strength;Pain;Range of motion  -BS     Assessment Comments  progressing toward ROM and MMT goals.   -BS     Please refer to  paper survey for additional self-reported information  Yes  -BS     Rehab Potential  Good  -BS     Patient/caregiver participated in establishment of treatment plan and goals  Yes  -BS     Patient would benefit from skilled therapy intervention  Yes  -BS        PT Plan    PT Frequency  2x/week  -BS     Predicted Duration of Therapy Intervention (Therapy Eval)  8 weeks  -BS     Planned CPT's?  PT THER PROC EA 15 MIN: 31323;PT THER ACT EA 15 MIN: 30217;PT MANUAL THERAPY EA 15 MIN: 20916;PT ELECTRICAL STIM UNATTEND: ;PT HOT OR COLD PACK TREAT [facility];PT THER SUPP EA 15 MIN  -BS     Physical Therapy Interventions (Optional Details)  ROM (Range of Motion);strengthening;stretching;home exercise program;manual therapy techniques;modalities  -BS     PT Plan Comments  continue per protocol.  -BS       User Key  (r) = Recorded By, (t) = Taken By, (c) = Cosigned By    Initials Name Provider Type    BS Ashvin Heaton, PT Physical Therapist            OP Exercises     Row Name 09/04/19 0800             Subjective Comments    Subjective Comments  pt reports 10-20% improvement.  -BS         Subjective Pain    Able to rate subjective pain?  yes  -BS      Pre-Treatment Pain Level  3  -BS      Post-Treatment Pain Level  3  -BS         Exercise 1    Exercise Name 1  pro ll UE strength   -BS      Time 1  10'  -BS      Additional Comments  L1  -BS         Exercise 2    Exercise Name 2  pulley's flexion/abd  -BS      Sets 2  3  -BS      Reps 2  10 ea  -BS         Exercise 3    Exercise Name 3  supine AROM shoulder flexion  -BS      Sets 3  2  -BS      Reps 3  10  -BS      Additional Comments  2# dumbbell  -BS         Exercise 4    Exercise Name 4  wall climbs-flex  -BS      Sets 4  1  -BS      Reps 4  10  -BS         Exercise 5    Exercise Name 5  standing shoulder ext S w/ dowel  -BS      Sets 5  1  -BS      Reps 5  20  -BS         Exercise 6    Exercise Name 6  Supine Serratus Punches  -BS      Sets 6  2  -BS      Reps 6  10  -BS       Additional Comments  2 # db  -BS         Exercise 7    Exercise Name 7  AROM/MMT reassessment  -BS      Time 7  2' total  -BS         Exercise 10    Exercise Name 10  overhead press with wand  -BS      Sets 10  2  -BS      Reps 10  10  -BS      Additional Comments  2# wand  -BS        User Key  (r) = Recorded By, (t) = Taken By, (c) = Cosigned By    Initials Name Provider Type    BS Ashvin Heaton, PT Physical Therapist                        Outcome Measure Options: Quick DASH  Quick DASH  Open a tight or new jar.: Severe Difficulty  Do heavy household chores (e.g., wash walls, wash floors): Unable  Carry a shopping bag or briefcase: Severe Difficulty  Wash your back: Unable  Use a knife to cut food: Severe Difficulty  Recreational activities in which you take some force or impact through your arm, should or hand (e.g. golf, hammering, tennis, etc.): Severe Difficulty  During the past week, to what extent has your arm, shoulder, or hand problem interfered with your normal social activites with family, friends, neighbors or groups?: Quite a bit  During the past week, were you limited in your work or other regular daily activities as a result of your arm, shoulder or hand problem?: Very limited  Arm, Shoulder, or hand pain: Severe  Tingling (pins and needles) in your arm, shoulder, or hand: Extreme  During the past week, how much difficulty have you had sleeping because of the pain in your arm, shoulder or hand?: Severe Difficulty  Number of Questions Answered: 11  Quick DASH Score: 81.82         Time Calculation:     Start Time: 0805  Stop Time: 0853  Time Calculation (min): 48 min  Total Timed Code Minutes- PT: 48 minute(s)     Therapy Charges for Today     Code Description Service Date Service Provider Modifiers Qty    64832349088 HC PT THER PROC EA 15 MIN 9/4/2019 Ashvin Heaton, PT GP 3          PT G-Codes  Outcome Measure Options: Quick DASH  Quick DASH Score: 81.82         Ashvin Heaton, ANN  9/4/2019

## 2019-09-11 ENCOUNTER — APPOINTMENT (OUTPATIENT)
Dept: PHYSICAL THERAPY | Facility: HOSPITAL | Age: 64
End: 2019-09-11

## 2019-09-13 ENCOUNTER — HOSPITAL ENCOUNTER (OUTPATIENT)
Dept: PHYSICAL THERAPY | Facility: HOSPITAL | Age: 64
Setting detail: THERAPIES SERIES
Discharge: HOME | End: 2019-09-13

## 2019-09-13 DIAGNOSIS — Z98.890 STATUS POST RIGHT ROTATOR CUFF REPAIR: ICD-10-CM

## 2019-09-13 DIAGNOSIS — M75.111 NONTRAUMATIC INCOMPLETE TEAR OF RIGHT ROTATOR CUFF: Primary | ICD-10-CM

## 2019-09-13 PROCEDURE — 97110 THERAPEUTIC EXERCISES: CPT

## 2019-09-13 NOTE — THERAPY TREATMENT NOTE
Outpatient Physical Therapy Ortho Treatment Note  [facility]     Patient Name: Jeramy South  : 1955  MRN: [id number]  Today's Date: 2019      Visit Date: 2019     Subjective Improvement: 25%  Attendance:   (medicare)  Next MD Visit : 19  Recert Date:  19      Therapy Diagnosis:  S/P R RTC Repair 19        Visit Dx:    ICD-10-CM ICD-9-CM   1. Nontraumatic incomplete tear of right rotator cuff M75.111 726.13   2. Status post right rotator cuff repair Z98.890 V45.89       There is no problem list on file for this patient.       No past medical history on file.     No past surgical history on file.    PT Ortho     Row Name 19 1000       Precautions and Contraindications    Precautions/Limitations  fall precautions  -    Precautions  s/P R RTC Repair 19 L ankle ORIF 2018  -    Contraindications  8 weeks 4 days post op  -       Posture/Observations    Posture/Observations Comments  No sling; fwd flexed at trunk   -      User Key  (r) = Recorded By, (t) = Taken By, (c) = Cosigned By    Initials Name Provider Type    Dariana Cedeño PTA Physical Therapy Assistant                      PT Assessment/Plan     Row Name 19 1000          PT Assessment    Assessment Comments  continues to make improvements with ROM and strength in the R shoulder  -        PT Plan    PT Frequency  2x/week  -     Predicted Duration of Therapy Intervention (Therapy Eval)  8 weeks  -     PT Plan Comments  Continue per protocl. If continued improvement possible d/c in the next 2 weeks  -       User Key  (r) = Recorded By, (t) = Taken By, (c) = Cosigned By    Initials Name Provider Type    Dariana Cedeño PTA Physical Therapy Assistant          Modalities     Row Name 19 1000             Ice    Patient reports will apply ice at home to involved area  Yes  -        User Key  (r) = Recorded By, (t) = Taken By, (c) = Cosigned By    Initials Name Provider Type     Dariana Cedeño PTA Physical Therapy Assistant        OP Exercises     Row Name 09/13/19 1000             Subjective Comments    Subjective Comments  Pt reports that he missed his last appt secondary to having a mix up;  having to have a root canal; Shoulder is doing well but states that he is ready to do the machines.   -KH         Subjective Pain    Able to rate subjective pain?  yes  -KH      Pre-Treatment Pain Level  2  -KH         Exercise 1    Exercise Name 1  pro ll UE strength  -KH      Cueing 1  Verbal  -KH      Time 1  10'  -KH      Additional Comments  level 3.5  -KH         Exercise 2    Exercise Name 2  pulley's flexion/abd  -KH      Cueing 2  Verbal  -KH      Sets 2  3  -KH      Reps 2  10  -KH         Exercise 3    Exercise Name 3  cybex incline pull  -KH      Cueing 3  Verbal  -KH      Sets 3  2  -KH      Reps 3  10  -KH      Additional Comments  30#  -KH         Exercise 4    Exercise Name 4  cybex row  -KH      Cueing 4  Verbal  -KH      Sets 4  2  -KH      Reps 4  10  -KH      Additional Comments  30#  -KH         Exercise 5    Exercise Name 5  shoulder PRE's 3 way   -KH      Cueing 5  Verbal  -KH      Sets 5  2  -KH      Reps 5  10  -KH      Additional Comments  1$ db  -KH         Exercise 6    Exercise Name 6  DB overhead press  -KH      Cueing 6  Verbal  -KH      Sets 6  2  -KH      Reps 6  10  -KH      Additional Comments  1# db  -KH         Exercise 7    Exercise Name 7  seated DB PNF   -KH      Cueing 7  Verbal  -KH      Sets 7  1  -KH      Reps 7  10  -KH      Additional Comments  1# db  -KH        User Key  (r) = Recorded By, (t) = Taken By, (c) = Cosigned By    Initials Name Provider Type    Dariana Cedeño PTA Physical Therapy Assistant                       PT OP Goals     Row Name 09/13/19 1000          PT Short Term Goals    STG Date to Achieve  08/14/19  -KH     STG 1  Patient independent in home exercise program  -KH     STG 1 Progress  Ongoing;Progressing  -KH     STG 2    Patient have passive shoulder flexion 165 degrees  -     STG 2 Progress  Met  -     STG 3  Patient have shoulder passive range of motion abduction 160 degrees  -     STG 3 Progress  Met  -     STG 4  Patient have external rotation at 45 degrees abduction to 55 degrees or better  -     STG 4 Progress  Met  -     STG 5  Patient able to progress to active assistive program at 4 weeks  -     STG 5 Progress  Met  -     STG 6  Patient had a quick-score of 70% or less  -     STG 6 Progress  Met  -     STG 6 Progress Comments  25%  -        Long Term Goals    LTG 1  Patient have a quick-score of less than 30%  -     LTG 1 Progress  Met  -     LTG 2  Patient had manual muscle testing 5/5 shoulder flexion  -     LTG 2 Progress  Progressing  -     LTG 3  Patient have shoulder abduction 4+ MMT  -     LTG 3 Progress  Progressing  -     LTG 4  Patient have active range of motion 160 degrees flexion  -     LTG 4 Progress  Progressing  -     LTG 5  Patient have active shoulder flexion 160 degrees  -     LTG 5 Progress  Progressing  -     LTG 6  Patient have active internal rotation reach equal to the left  -     LTG 6 Progress  Progressing  -     LTG 7  Patient have active external rotation at 90 degrees abduction to 80 degrees or better  -     LTG 7 Progress  Progressing  -        Time Calculation    PT Goal Re-Cert Due Date  09/25/19  -       User Key  (r) = Recorded By, (t) = Taken By, (c) = Cosigned By    Initials Name Provider Type    Dariana Cedeño PTA Physical Therapy Assistant                         Time Calculation:   Start Time: 1010  Stop Time: 1050  Time Calculation (min): 40 min  Total Timed Code Minutes- PT: 40 minute(s)  Therapy Charges for Today     Code Description Service Date Service Provider Modifiers Qty    [id number]  PT THER PROC EA 15 MIN 9/13/2019 Dariana Shi PTA GP 3                    Dariana Shi PTA  9/13/2019

## 2019-09-16 ENCOUNTER — HOSPITAL ENCOUNTER (OUTPATIENT)
Dept: PHYSICAL THERAPY | Facility: HOSPITAL | Age: 64
Setting detail: THERAPIES SERIES
Discharge: HOME | End: 2019-09-16

## 2019-09-16 DIAGNOSIS — Z98.890 STATUS POST RIGHT ROTATOR CUFF REPAIR: ICD-10-CM

## 2019-09-16 DIAGNOSIS — M75.111 NONTRAUMATIC INCOMPLETE TEAR OF RIGHT ROTATOR CUFF: Primary | ICD-10-CM

## 2019-09-16 PROCEDURE — 97110 THERAPEUTIC EXERCISES: CPT

## 2019-09-16 NOTE — THERAPY TREATMENT NOTE
Outpatient Physical Therapy Ortho Treatment Note  [facility]     Patient Name: Jeramy South  : 1955  MRN: [id number]  Today's Date: 2019      Visit Date: 2019     Subjective Improvement: 50%  Attendance:   (medicare)  Next MD Visit : 19  Recert Date:  19      Therapy Diagnosis:  S/P R RTC Repair 19  \      Visit Dx:    ICD-10-CM ICD-9-CM   1. Nontraumatic incomplete tear of right rotator cuff M75.111 726.13   2. Status post right rotator cuff repair Z98.890 V45.89       There is no problem list on file for this patient.       No past medical history on file.     No past surgical history on file.    PT Ortho     Row Name 19 1016       Precautions and Contraindications    Precautions/Limitations  fall precautions  -    Precautions  s/P R RTC Repair 19 L ankle ORIF 2018  -    Contraindications  9 weeks s/p  -       Posture/Observations    Posture/Observations Comments  No sling; fwd flexed at trunk   -       General ROM    GENERAL ROM COMMENTS  standing AROM Flex 160; abd 162; ER at side 70; IR behind back T12  -      User Key  (r) = Recorded By, (t) = Taken By, (c) = Cosigned By    Initials Name Provider Type    Dariana Cedeño PTA Physical Therapy Assistant                      PT Assessment/Plan     Row Name 19 1016          PT Assessment    Assessment Comments  met all but one goal at this time. Progressing well with strength and ROM in the right shoulder and functional activities.   -        PT Plan    PT Frequency  2x/week  -     Predicted Duration of Therapy Intervention (Therapy Eval)  8 weeks  -     PT Plan Comments  Continue to progress through next week then d/c to independent program unless otherwise advised.   -       User Key  (r) = Recorded By, (t) = Taken By, (c) = Cosigned By    Initials Name Provider Type    Dariana Cedeño PTA Physical Therapy Assistant          Modalities     Row Name 19 1016              Subjective Pain    Post-Treatment Pain Level  1  -KH        User Key  (r) = Recorded By, (t) = Taken By, (c) = Cosigned By    Initials Name Provider Type    Dariana Cedeño PTA Physical Therapy Assistant        OP Exercises     Row Name 09/16/19 1016             Subjective Comments    Subjective Comments  Pt reports that he is doing well; No new issues with shoulder  -KH         Subjective Pain    Able to rate subjective pain?  yes  -KH      Pre-Treatment Pain Level  2  -KH      Post-Treatment Pain Level  1  -KH         Exercise 1    Exercise Name 1  pro ll UE strength  -KH      Cueing 1  Verbal  -KH      Time 1  10'  -KH      Additional Comments  level 3.5  -KH         Exercise 2    Exercise Name 2  seated shoulder 3 way PRE  -KH      Cueing 2  Verbal  -KH      Sets 2  2  -KH      Reps 2  10  -KH      Additional Comments  1# db  -KH         Exercise 3    Exercise Name 3  standing PNF   -KH      Cueing 3  Verbal  -KH      Sets 3  2  -KH      Reps 3  10  -KH      Additional Comments  1# db  -KH         Exercise 4    Exercise Name 4  cybex chest press  -KH      Cueing 4  Verbal  -KH      Sets 4  2  -KH      Reps 4  10  -KH      Additional Comments  15#  -KH         Exercise 5    Exercise Name 5  cybex incline pull  -KH      Cueing 5  Verbal  -KH      Sets 5  3  -KH      Reps 5  10  -KH      Additional Comments  50#  -KH         Exercise 6    Exercise Name 6  cybex row  -KH      Cueing 6  Verbal  -KH      Sets 6  3  -KH      Reps 6  10  -KH      Additional Comments  50#  -KH         Exercise 7    Exercise Name 7  checked strength and ROM   -KH        User Key  (r) = Recorded By, (t) = Taken By, (c) = Cosigned By    Initials Name Provider Type    Dariana Cedeño PTA Physical Therapy Assistant                       PT OP Goals     Row Name 09/16/19 1016          PT Short Term Goals    STG Date to Achieve  08/14/19  -KH     STG 1  Patient independent in home exercise program  -KH     STG 1 Progress  Ongoing;Progressing   -     STG 2   Patient have passive shoulder flexion 165 degrees  -     STG 2 Progress  Met  -     STG 3  Patient have shoulder passive range of motion abduction 160 degrees  -     STG 3 Progress  Met  -     STG 4  Patient have external rotation at 45 degrees abduction to 55 degrees or better  -     STG 4 Progress  Met  -     STG 5  Patient able to progress to active assistive program at 4 weeks  -     STG 5 Progress  Met  -     STG 6  Patient had a quick-score of 70% or less  -     STG 6 Progress  Met  -     STG 6 Progress Comments  25%  -        Long Term Goals    LTG 1  Patient have a quick-score of less than 30%  -     LTG 1 Progress  Met  -     LTG 2  Patient had manual muscle testing 5/5 shoulder flexion  -     LTG 2 Progress  Progressing  -     LTG 2 Progress Comments  4+/5  -     LTG 3  Patient have shoulder abduction 4+ MMT  -     LTG 3 Progress  Met  -     LTG 4  Patient have active range of motion 160 degrees flexion  -     LTG 4 Progress  Met  -     LTG 5  Patient have active shoulder flexion 160 degrees  -     LTG 5 Progress  Met  -     LTG 6  Patient have active internal rotation reach equal to the left  -     LTG 6 Progress  Met  -     LTG 7  Patient have active external rotation at 90 degrees abduction to 80 degrees or better  -     LTG 7 Progress  Met  -     LTG 7 Progress Comments  85 this date  -        Time Calculation    PT Goal Re-Cert Due Date  09/25/19  -       User Key  (r) = Recorded By, (t) = Taken By, (c) = Cosigned By    Initials Name Provider Type     Dariana Shi PTA Physical Therapy Assistant                         Time Calculation:   Start Time: 1016  Stop Time: 1057  Time Calculation (min): 41 min  Total Timed Code Minutes- PT: 41 minute(s)  Therapy Charges for Today     Code Description Service Date Service Provider Modifiers Qty    [id number] HC PT THER PROC EA 15 MIN 9/16/2019 Dariana Shi PTA GP 3                     Dariana Shi, PTA  9/16/2019

## 2019-09-17 ENCOUNTER — HOSPITAL ENCOUNTER (OUTPATIENT)
Dept: PHYSICAL THERAPY | Facility: HOSPITAL | Age: 64
Setting detail: THERAPIES SERIES
Discharge: HOME | End: 2019-09-17

## 2019-09-17 DIAGNOSIS — M75.111 NONTRAUMATIC INCOMPLETE TEAR OF RIGHT ROTATOR CUFF: Primary | ICD-10-CM

## 2019-09-17 DIAGNOSIS — Z98.890 STATUS POST RIGHT ROTATOR CUFF REPAIR: ICD-10-CM

## 2019-09-17 PROCEDURE — 97110 THERAPEUTIC EXERCISES: CPT

## 2019-09-23 ENCOUNTER — HOSPITAL ENCOUNTER (OUTPATIENT)
Dept: PHYSICAL THERAPY | Facility: HOSPITAL | Age: 64
Setting detail: THERAPIES SERIES
Discharge: HOME | End: 2019-09-23

## 2019-09-23 DIAGNOSIS — M75.111 NONTRAUMATIC INCOMPLETE TEAR OF RIGHT ROTATOR CUFF: Primary | ICD-10-CM

## 2019-09-23 DIAGNOSIS — Z98.890 STATUS POST RIGHT ROTATOR CUFF REPAIR: ICD-10-CM

## 2019-09-23 PROCEDURE — 97110 THERAPEUTIC EXERCISES: CPT

## 2019-09-23 NOTE — THERAPY TREATMENT NOTE
Outpatient Physical Therapy Ortho Treatment Note  [facility]     Patient Name: Jeramy South  : 1955  MRN: [id number]  Today's Date: 2019      Visit Date: 2019      Subjective Improvement: 75%  Attendance:   (medicare)  Next MD Visit : 19  Recert Date:  19      Therapy Diagnosis:  S/P R RTC Repair 19      Visit Dx:    ICD-10-CM ICD-9-CM   1. Nontraumatic incomplete tear of right rotator cuff M75.111 726.13   2. Status post right rotator cuff repair Z98.890 V45.89       There is no problem list on file for this patient.       No past medical history on file.     No past surgical history on file.    PT Ortho     Row Name 19 1016       Precautions and Contraindications    Precautions  s/P R RTC Repair 19 L ankle ORIF   -       Posture/Observations    Posture/Observations Comments  No sling; fwd flexed at trunk   -      User Key  (r) = Recorded By, (t) = Taken By, (c) = Cosigned By    Initials Name Provider Type    Dariana Cedeño PTA Physical Therapy Assistant                      PT Assessment/Plan     Row Name 19 1016          PT Assessment    Assessment Comments  met all goals at this time. progressing well with cybex equipment. no increased pain noted.   -        PT Plan    PT Frequency  2x/week  -BRISSA     Predicted Duration of Therapy Intervention (Therapy Eval)  8 weeks  -     PT Plan Comments  MD note next visit. One more visit then d/c to independent program and free month of fitness  -       User Key  (r) = Recorded By, (t) = Taken By, (c) = Cosigned By    Initials Name Provider Type    Dariana Cedeño PTA Physical Therapy Assistant          Modalities     Row Name 19 1016             Subjective Pain    Post-Treatment Pain Level  0  -        User Key  (r) = Recorded By, (t) = Taken By, (c) = Cosigned By    Initials Name Provider Type    Dariana Cedeño PTA Physical Therapy Assistant        OP Exercises     Row Name  09/23/19 1016             Subjective Comments    Subjective Comments  Pt reports that he is doing well. States that he has an MD appt on Friday  -KH         Subjective Pain    Able to rate subjective pain?  yes  -KH      Pre-Treatment Pain Level  0  -KH      Post-Treatment Pain Level  0  -KH         Exercise 1    Exercise Name 1  pro ll UE strength  -KH      Cueing 1  Verbal  -KH      Time 1  10'  -KH      Additional Comments  level 3.8; F/B  -KH         Exercise 2    Exercise Name 2  pulley's flexion/abd  -KH      Cueing 2  Verbal  -KH      Sets 2  2  -KH      Reps 2  10  -KH         Exercise 3    Exercise Name 3  Cybex Chest Press  -KH      Cueing 3  Verbal  -KH      Sets 3  3  -KH      Reps 3  10  -KH      Additional Comments  15#  -KH         Exercise 4    Exercise Name 4  Cybex Row  -KH      Cueing 4  Verbal  -KH      Sets 4  3  -KH      Reps 4  10  -KH      Additional Comments  50#  -KH         Exercise 5    Exercise Name 5  Cybex Incline Pull   -KH      Cueing 5  Verbal  -KH      Sets 5  3  -KH      Reps 5  10  -KH      Additional Comments  50#  -KH         Exercise 6    Exercise Name 6  Cybex Rear Delt   -KH      Cueing 6  Verbal  -KH      Sets 6  3  -KH      Reps 6  10  -KH      Additional Comments  30#  -KH         Exercise 7    Exercise Name 7  Cybex Ab Curl for Shoulder stab  -KH      Cueing 7  Verbal  -KH      Sets 7  3  -KH      Reps 7  10  -KH      Additional Comments  30#  -KH        User Key  (r) = Recorded By, (t) = Taken By, (c) = Cosigned By    Initials Name Provider Type    Dariana Cedeño PTA Physical Therapy Assistant                       PT OP Goals     Row Name 09/23/19 1016          PT Short Term Goals    STG Date to Achieve  08/14/19  -KH     STG 1  Patient independent in home exercise program  -KH     STG 1 Progress  Met  -KH     STG 2   Patient have passive shoulder flexion 165 degrees  -KH     STG 2 Progress  Met  -KH     STG 3  Patient have shoulder passive range of motion abduction  160 degrees  -     STG 3 Progress  Met  -     STG 4  Patient have external rotation at 45 degrees abduction to 55 degrees or better  -     STG 4 Progress  Met  -     STG 5  Patient able to progress to active assistive program at 4 weeks  -     STG 5 Progress  Met  -     STG 6  Patient had a quick-score of 70% or less  -     STG 6 Progress  Met  -     STG 6 Progress Comments  25%  -        Long Term Goals    LTG 1  Patient have a quick-score of less than 30%  -     LTG 1 Progress  Met  -     LTG 2  Patient had manual muscle testing 5/5 shoulder flexion  -     LTG 2 Progress  Met  -     LTG 3  Patient have shoulder abduction 4+ MMT  -     LTG 3 Progress  Met  -     LTG 4  Patient have active range of motion 160 degrees flexion  -     LTG 4 Progress  Met  -     LTG 5  Patient have active shoulder flexion 160 degrees  -     LTG 5 Progress  Met  -     LTG 6  Patient have active internal rotation reach equal to the left  -     LTG 6 Progress  Met  -     LTG 7  Patient have active external rotation at 90 degrees abduction to 80 degrees or better  -     LTG 7 Progress  Met  -        Time Calculation    PT Goal Re-Cert Due Date  09/25/19  -       User Key  (r) = Recorded By, (t) = Taken By, (c) = Cosigned By    Initials Name Provider Type    Dariana Cedeño PTA Physical Therapy Assistant                         Time Calculation:   Start Time: 1016  Stop Time: 1054  Time Calculation (min): 38 min  Therapy Charges for Today     Code Description Service Date Service Provider Modifiers Qty    63524164012 HC PT THER PROC EA 15 MIN 9/23/2019 Dariana Shi PTA GP 3                    Dariana Shi PTA  9/23/2019

## 2019-09-24 ENCOUNTER — HOSPITAL ENCOUNTER (OUTPATIENT)
Dept: PHYSICAL THERAPY | Facility: HOSPITAL | Age: 64
Setting detail: THERAPIES SERIES
Discharge: HOME | End: 2019-09-24

## 2019-09-24 DIAGNOSIS — M75.111 NONTRAUMATIC INCOMPLETE TEAR OF RIGHT ROTATOR CUFF: Primary | ICD-10-CM

## 2019-09-24 DIAGNOSIS — Z98.890 STATUS POST RIGHT ROTATOR CUFF REPAIR: ICD-10-CM

## 2019-09-24 PROCEDURE — 95852 RANGE OF MOTION MEASUREMENTS: CPT

## 2019-09-24 PROCEDURE — 97110 THERAPEUTIC EXERCISES: CPT

## 2019-09-24 NOTE — THERAPY DISCHARGE NOTE
Outpatient Physical Therapy Ortho Treatment Note/Discharge Summary  [facility]     Patient Name: Jeramy South  : 1955  MRN: [id number]  Today's Date: 2019      Visit Date: 2019     Subjective Improvement: 85%  Attendance:  15/16 (medicare)  Next MD Visit : 19  Recert Date:  19        Therapy Diagnosis:  S/P R RTC Repair 19       Visit Dx:    ICD-10-CM ICD-9-CM   1. Nontraumatic incomplete tear of right rotator cuff M75.111 726.13   2. Status post right rotator cuff repair Z98.890 V45.89       There is no problem list on file for this patient.       No past medical history on file.     No past surgical history on file.    PT Ortho     Row Name 19 1000       Precautions and Contraindications    Precautions/Limitations  fall precautions  -    Precautions  s/P R RTC Repair 19 L ankle ORIF 2018  -       Posture/Observations    Posture/Observations Comments  No sling; fwd flexed at trunk   -       General ROM    GENERAL ROM COMMENTS  ER 85 (90/90)  -       MMT (Manual Muscle Testing)    General MMT Comments  5/5 all planes  -    Row Name 19 1016       Precautions and Contraindications    Precautions  s/P R RTC Repair 19 L ankle ORIF 2018  -       Posture/Observations    Posture/Observations Comments  No sling; fwd flexed at trunk   -      User Key  (r) = Recorded By, (t) = Taken By, (c) = Cosigned By    Initials Name Provider Type    Dariana Cedeño PTA Physical Therapy Assistant                      PT Assessment/Plan     Row Name 19 1000          PT Assessment    Assessment Comments  met all goals at this time. MD note sent with patient.   -        PT Plan    PT Frequency  2x/week  -BRISSA     Predicted Duration of Therapy Intervention (Therapy Eval)  8 weeks  -     PT Plan Comments  D/C to fitness membership and HEP;   -       User Key  (r) = Recorded By, (t) = Taken By, (c) = Cosigned By    Initials Name Provider Type     Dariana Cedeño PTA Physical Therapy Assistant              OP Exercises     Row Name 09/24/19 1000             Subjective Comments    Subjective Comments  Pt reports that he is finally starting to feel good.   -         Subjective Pain    Able to rate subjective pain?  yes  -      Pre-Treatment Pain Level  0  -      Post-Treatment Pain Level  0  -         Exercise 1    Exercise Name 1  pro ll LE/UE strength  -      Cueing 1  Verbal  -      Time 1  12'  -      Additional Comments  level 4.2; F/B  -         Exercise 2    Exercise Name 2  Checked ROM/STrength  -      Cueing 2  Tactile  -         Exercise 3    Exercise Name 3  MD note and Review of machines/fitness routine.   -        User Key  (r) = Recorded By, (t) = Taken By, (c) = Cosigned By    Initials Name Provider Type    Dariana Cedeño PTA Physical Therapy Assistant                         PT OP Goals     Row Name 09/24/19 1000          PT Short Term Goals    STG Date to Achieve  08/14/19  -     STG 1  Patient independent in home exercise program  -     STG 1 Progress  Met  -     STG 2   Patient have passive shoulder flexion 165 degrees  -     STG 2 Progress  Met  -     STG 3  Patient have shoulder passive range of motion abduction 160 degrees  -     STG 3 Progress  Met  -     STG 4  Patient have external rotation at 45 degrees abduction to 55 degrees or better  -     STG 4 Progress  Met  -     STG 5  Patient able to progress to active assistive program at 4 weeks  -     STG 5 Progress  Met  -     STG 6  Patient had a quick-score of 70% or less  -     STG 6 Progress  Met  -     STG 6 Progress Comments  25%  -        Long Term Goals    LTG 1  Patient have a quick-score of less than 30%  -     LTG 1 Progress  Met  -     LTG 2  Patient had manual muscle testing 5/5 shoulder flexion  -     LTG 2 Progress  Met  -     LTG 3  Patient have shoulder abduction 4+ MMT  -     LTG 3 Progress  Met  -     LTG 4   Patient have active range of motion 160 degrees flexion  -     LTG 4 Progress  Met  -     LTG 5  Patient have active shoulder flexion 160 degrees  -     LTG 5 Progress  Met  -     LTG 6  Patient have active internal rotation reach equal to the left  [facility]  [facility]  -     LTG 7  Patient have active external rotation at 90 degrees abduction to 80 degrees or better  [facility]  Met  -KH        Time Calculation    PT Goal Re-Cert Due Date  09/25/19  -       User Key  (r) = Recorded By, (t) = Taken By, (c) = Cosigned By    Initials Name Provider Type    Dariana Cedeño PTA Physical Therapy Assistant                         Time Calculation:   Start Time: 1005  Stop Time: 1030  Time Calculation (min): 25 min  Total Timed Code Minutes- PT: 25 minute(s)  Therapy Charges for Today     Code Description Service Date Service Provider Modifiers Qty    [id number]  PT THER PROC EA 15 MIN 9/24/2019 Dariana Shi PTA GP 1    [id number]  PT-ROM JUANA & REP-HAND 9/24/2019 Dariana Shi PTA  1                OP PT Discharge Summary  Date of Discharge: 09/24/19  Reason for Discharge: All goals achieved, Independent  Outcomes Achieved: Able to achieve all goals within established timeline  Discharge Destination: Home with home program      Dariana Shi PTA  9/24/2019

## 2019-12-23 ENCOUNTER — TRANSCRIBE ORDERS (OUTPATIENT)
Dept: PHYSICAL THERAPY | Facility: HOSPITAL | Age: 64
End: 2019-12-23

## 2019-12-23 DIAGNOSIS — M54.50 LOW BACK PAIN, UNSPECIFIED BACK PAIN LATERALITY, UNSPECIFIED CHRONICITY, UNSPECIFIED WHETHER SCIATICA PRESENT: Primary | ICD-10-CM

## 2020-01-06 ENCOUNTER — HOSPITAL ENCOUNTER (OUTPATIENT)
Dept: PHYSICAL THERAPY | Facility: HOSPITAL | Age: 65
Setting detail: THERAPIES SERIES
Discharge: HOME | End: 2020-01-06

## 2020-01-06 DIAGNOSIS — M54.41 CHRONIC MIDLINE LOW BACK PAIN WITH BILATERAL SCIATICA: Primary | ICD-10-CM

## 2020-01-06 DIAGNOSIS — M54.42 CHRONIC MIDLINE LOW BACK PAIN WITH BILATERAL SCIATICA: Primary | ICD-10-CM

## 2020-01-06 DIAGNOSIS — G89.29 CHRONIC MIDLINE LOW BACK PAIN WITH BILATERAL SCIATICA: Primary | ICD-10-CM

## 2020-01-06 PROCEDURE — 97161 PT EVAL LOW COMPLEX 20 MIN: CPT

## 2020-01-06 PROCEDURE — 97535 SELF CARE MNGMENT TRAINING: CPT

## 2020-01-06 PROCEDURE — 97530 THERAPEUTIC ACTIVITIES: CPT

## 2020-01-06 NOTE — THERAPY EVALUATION
"    Outpatient Physical Therapy Ortho Initial Evaluation  [facility]     Patient Name: Jeramy South  : 1955  MRN: [id number]  Today's Date: 2020      Visit Date: 2020    There is no problem list on file for this patient.       History reviewed. No pertinent past medical history.     Past Surgical History:   Procedure Laterality Date   • CARDIAC CATHETERIZATION      2012 tripple bipass (per pt)       Visit Dx:     ICD-10-CM ICD-9-CM   1. Chronic midline low back pain with bilateral sciatica M54.41 724.2    M54.42 724.3    G89.29 338.29         Patient History     Row Name 20 1100             History    Chief Complaint  Pain  -AH      Type of Pain  Back pain  -AH      Date Current Problem(s) Began  97 \"approximately\" per pt  -      Brief Description of Current Complaint  Pt states has CLBP for years and a multitude of PMH issues secondary to his initial/original worker's compensation claim.  Pt reports he is interested in a fitness membership so he can come and exercise more regularly once completed c his PT visits.  Pt reports intermittent L>R LE N&T.  Pt has Hx of R RTC repair & L ankle fusion (recent as of ).  Pt has Hx of tripple bipass 8 years ago and also lumbar fusion ~, per pt report.  -      Patient/Caregiver Goals  Relieve pain;Return to prior level of function;Improve mobility;Improve strength;Know what to do to help the symptoms obtain fitness membership x1yr through work comp.  -      Current Tobacco Use  sometimes smoke  -      Smoking Status  5-6 cigs/day  -      Patient's Rating of General Health  Fair  -      Hand Dominance  right-handed  -      Occupation/sports/leisure activities  retired/disability; family time, ride [employer]  -         Pain     Pain Location  Back  -AH      Pain at Present  3  -AH      Pain at Best  2  -AH      Pain Frequency  Constant/continuous low grade constant  -AH      Pain Description  " Aching;Numbness;Tingling;Spasm;Throbbing;Tender  -      What Performance Factors Make the Current Problem(s) WORSE?  walking, sleeping, poor weather days  -      What Performance Factors Make the Current Problem(s) BETTER?  resting in sitting for short while, hot shower  -      Tolerance Time- Standing  30'  -      Tolerance Time- Sitting  30-60'  -      Tolerance Time- Walking  5-10'  -      What position do you sleep in?  Supine;Left sidelying;Right sidelying  -      Difficulties with ADL's?  heavier activities  -      Difficulties with recreational activities?  4 rangel  -         Fall Risk Assessment    Any falls in the past year:  Yes  -      Number of falls reported in the last 12 months  2-3  -      Factors that contributed to the fall:  Fatigue;Lost balance;Other (comment) pain  -      Does patient have a fear of falling  No  -         Daily Activities    Primary Language  English  -      Barriers to learning  Hearing  -      Action taken for identified issues  speeak up & directly to pt.  Pt has hearing aids prn  -      Pt Participated in POC and Goals  Yes  -         Safety    Are you being hurt, hit, or frightened by anyone at home or in your life?  No  -      Are you being neglected by a caregiver  No  -        User Key  (r) = Recorded By, (t) = Taken By, (c) = Cosigned By    Initials Name Provider Type     Yosi Londono, PT Physical Therapist          PT Ortho     Row Name 01/06/20 1100       Subjective Pain    Able to rate subjective pain?  yes  -       Posture/Observations    Posture/Observations Comments  Posture static & dynamic is rigid & labored grossly with all transitional movements.  -       General ROM    GENERAL ROM COMMENTS  trunk grossly limited 50% flex, 90% ext, 20% B SB, 80% B rotation.  -       MMT (Manual Muscle Testing)    General MMT Comments  total body gross strength at least 3-/5 functional mobility testing.  -       Sensation  "   Sensation WNL?  WFL  -    Additional Comments  no N&T Sx this session  -       Flexibility    Flexibility Tested?  Lower Extremity  -       Lower Extremity Flexibility    Hamstrings  Bilateral:;Moderately limited  -    LE Other Flexibility  Bilateral:;Moderately limited thoracolumbar fascia  -       Transfers    Transfer, Impairments  pain;ROM decreased;decreased flexibility;strength decreased;postural control impaired  -       Gait/Stairs Assessment/Training    Distance in Feet (Gait)  130' 2  -    Pattern (Gait)  2-point  -    Deviations/Abnormal Patterns (Gait)  antalgic;stride length decreased;gait speed decreased;raina decreased;base of support, wide;bilateral deviations  -    Bilateral Gait Deviations  lateral trunk flexion rigid c mod guarding posture UEs  -    Stairs, Impairments  ROM decreased;decreased flexibility;strength decreased;impaired balance;pain;postural control impaired  -      User Key  (r) = Recorded By, (t) = Taken By, (c) = Cosigned By    Initials Name Provider Type     Yosi Londono, PT Physical Therapist                      Therapy Education  Education Details: HEP, POC, Symptomology  Given: HEP, Symptoms/condition management, Pain management, Posture/body mechanics, Fall prevention and home safety, Mobility training  Program: New  How Provided: Verbal, Demonstration, Written  Provided to: Patient  Level of Understanding: Teach back education performed, Verbalized, Demonstrated  91502 - PT Self Care/Mgmt Minutes: 15     PT OP Goals     Row Name 01/06/20 1200          PT Short Term Goals    STG Date to Achieve  01/30/20  -     STG 1  Pt to be independent c HEP  -     STG 1 Progress  New  -     STG 2  Pt to improve SEBAS by 10%  -     STG 2 Progress  [facility]     STG 3  Pt to improve 5x sit-stand by 5\" speed, improved efficiency/comfort&ease of movement, reduced need for UE support/assist, or reduced pain by 50%.  -     STG 3 Progress  New  -     " STG 4  Pt to report/demo 50% improved Sx/function c regards to general mobility tolerance & ability.  -     STG 4 Progress  New  -     STG 5  Pt to successfully transition to self guided fitness center routine  -     STG 5 Progress  New  [facility]        Long Term Goals    LTG 1  deferred  -        Time Calculation    PT Goal Re-Cert Due Date  01/30/20  -       User Key  (r) = Recorded By, (t) = Taken By, (c) = Cosigned By    Initials Name Provider Type     Yosi Londono, PT Physical Therapist          PT Assessment/Plan     Row Name 01/06/20 1200          PT Assessment    Functional Limitations  Decreased safety during functional activities;Impaired gait;Limitation in home management;Limitations in community activities;Limitations in functional capacity and performance;Performance in leisure activities;Performance in self-care ADL  -     Impairments  Balance;Gait;Endurance;Impaired flexibility;Impaired muscle endurance;Joint integrity;Joint mobility;Muscle strength;Pain;Poor body mechanics;Posture;Range of motion;Sensation  -     Assessment Comments  65 y/o male presents to PT c long standing CLBP secondary to old work related injury.  Pt presents c high guarding and rigidity grosly through all activities, postures, and prolonged positions.  Pt has had previous PT for this in the past and has had some success/relief, but is interested in a more long term routine ability.  Pt expressed interest in a fitness membership post completion of PT episode & intake personel are working to have it approved through worker's comp.  Pt will benefit from a routine ability to attend fitness courses & use fitness gym to self progress and self maintain for improved flexibility and strength to allow pt greater quality of life and reduced pain & dysfunctions.  This will be the primary focus of this PT episode, due to pt's long standing and recurrent Hx of frequent PT episodes annually.  -     Please refer to paper  "survey for additional self-reported information  Yes  -     Rehab Potential  Good  -     Patient/caregiver participated in establishment of treatment plan and goals  Yes  -     Patient would benefit from skilled therapy intervention  Yes  -        PT Plan    PT Frequency  2x/week  -     Predicted Duration of Therapy Intervention (Therapy Eval)  3-4 wks  -     Planned CPT's?  PT EVAL LOW COMPLEXITY: 23435;PT RE-EVAL: 21508;PT THER PROC EA 15 MIN: 80451;PT THER ACT EA 15 MIN: 38842;PT MANUAL THERAPY EA 15 MIN: 81690;PT NEUROMUSC RE-EDUCATION EA 15 MIN: 81362;PT GAIT TRAINING EA 15 MIN: 39606;PT SELF CARE/HOME MGMT/TRAIN EA 15: 59707;PT ELECTRICAL STIM UNATTEND: ;PT HOT/COLD PACK WC NONMCARE: 13219;PT THER SUPP EA 15 MIN  -     Physical Therapy Interventions (Optional Details)  balance training;gait training;home exercise program;joint mobilization;lumbar stabilization;manual therapy techniques;modalities;neuromuscular re-education;patient/family education;postural re-education;ROM (Range of Motion);strengthening;stretching;swiss ball techniques;transfer training  [facility]       User Key  (r) = Recorded By, (t) = Taken By, (c) = Cosigned By    Initials Name Provider Type     Yosi Londono, PT Physical Therapist            OP Exercises     Row Name 01/06/20 1200 01/06/20 1100          Subjective Pain    Able to rate subjective pain?  --  yes  -     Pre-Treatment Pain Level  --  3  -AH     Post-Treatment Pain Level  --  3  -     Subjective Pain Comment  --  Pt reports pain is currently a low grade constant.  -        Total Minutes    82779 - PT Therapeutic Activity Minutes  10  -AH  --        Exercise 1    Exercise Name 1  --  Wall/countertop plank: high knee posture firehydrants B  -     Cueing 1  --  Verbal;Demo  -     Sets 1  --  2  -     Reps 1  --  5  -     Time 1  --  1\" to reduce pt's overall speed/compensations  -     Additional Comments  --  HEP proprioceptive enhancement for " "improved thoracolumbar & B hip/pelvic girdle tissue pliability/strength function.  -        Exercise 2    Exercise Name 2  --  Prayer/child's pose spinal tissue stretch at rail; 3-way  -     Cueing 2  --  Verbal;Demo;Tactile  -     Sets 2  --  1  -     Reps 2  --  5  -     Time 2  --  10\"  -     Additional Comments  --  HEP proprioceptive enhancement for postural tissue mobility & pliability.  -        Exercise 3    Exercise Name 3  --  Squatted at table/counter (LE's braced on table/counter): trunk rotations in gentle fwd trunk flex  -     Additional Comments  --  trial next session.  -       User Key  (r) = Recorded By, (t) = Taken By, (c) = Cosigned By    Initials Name Provider Type    Yosi Thomas, PT Physical Therapist                        Outcome Measure Options: Modifed Owestry, 5x Sit to Stand  5 Times Sit to Stand  5 Times Sit to Stand (seconds): 44.73 seconds  5 Times Sit to Stand Comments: standard straight back chair, B use of hands for assist, labored & painful movement  Modified Oswestry  Modified Oswestry Score/Comments: 21/50 = 42% disability      Time Calculation:     Start Time: 1115  Stop Time: 1200  Time Calculation (min): 45 min  Total Timed Code Minutes- PT: 25 minute(s)     Therapy Charges for Today     Code Description Service Date Service Provider Modifiers Qty    49698908782  PT THERAPEUTIC ACT EA 15 MIN 1/6/2020 Yosi Londono, PT GP 1    [id number]  PT SELF CARE/MGMT/TRAIN EA 15 MIN 1/6/2020 Yosi Londono, PT GP 1    [id number]  PT EVAL LOW COMPLEXITY 1 1/6/2020 Yosi Londono, PT GP 1          PT G-Codes  Outcome Measure Options: Modifed Owestry, 5x Sit to Stand  Modified Oswestry Score/Comments: 21/50 = 42% disability         Yosi Londono PT  1/6/2020      "

## 2020-01-09 ENCOUNTER — HOSPITAL ENCOUNTER (OUTPATIENT)
Dept: PHYSICAL THERAPY | Facility: HOSPITAL | Age: 65
Setting detail: THERAPIES SERIES
Discharge: HOME | End: 2020-01-09

## 2020-01-09 DIAGNOSIS — M54.42 CHRONIC MIDLINE LOW BACK PAIN WITH BILATERAL SCIATICA: Primary | ICD-10-CM

## 2020-01-09 DIAGNOSIS — G89.29 CHRONIC MIDLINE LOW BACK PAIN WITH BILATERAL SCIATICA: Primary | ICD-10-CM

## 2020-01-09 DIAGNOSIS — M54.41 CHRONIC MIDLINE LOW BACK PAIN WITH BILATERAL SCIATICA: Primary | ICD-10-CM

## 2020-01-09 PROCEDURE — 97110 THERAPEUTIC EXERCISES: CPT

## 2020-01-09 NOTE — THERAPY TREATMENT NOTE
Outpatient Physical Therapy Ortho Treatment Note  [facility]     Patient Name: Jeramy South  : 1955  MRN: [id number]  Today's Date: 2020      Visit Date: 2020     Subjective Improvement: 0%  Attendance: 2/2 (6 visits approved)  Next MD Visit : ??  Recert Date:  2020      Therapy Diagnosis:  Chronic Low Back Pain         Visit Dx:    ICD-10-CM ICD-9-CM   1. Chronic midline low back pain with bilateral sciatica M54.41 724.2    M54.42 724.3    G89.29 338.29       There is no problem list on file for this patient.       No past medical history on file.     Past Surgical History:   Procedure Laterality Date   • CARDIAC CATHETERIZATION      2012 tripple bipass (per pt)       PT Ortho     Row Name 20 0800       Precautions and Contraindications    Precautions  Lumbar Fusion/ORIF L ankle   -       Posture/Observations    Posture/Observations Comments  fwd flexed at trunk in standing and decrease heel toe movement in the L ankle with gait   -      User Key  (r) = Recorded By, (t) = Taken By, (c) = Cosigned By    Initials Name Provider Type    Dariana Cedeño PTA Physical Therapy Assistant                      PT Assessment/Plan     Row Name 20 1200          PT Assessment    Assessment Comments  progressed with strength and conditioning this date to allow patient to move forward with independent fitness routine. Tolerated well but did have some soreness post treatment. Pt very rigid with movement throughout core and hips;   -        PT Plan    PT Frequency  2x/week  -     Predicted Duration of Therapy Intervention (Therapy Eval)  3-4 weeks  -     PT Plan Comments  Continue with core strength, trunk ROM and progressing pt to independent routine  -       User Key  (r) = Recorded By, (t) = Taken By, (c) = Cosigned By    Initials Name Provider Type    Dariana Cedeño PTA Physical Therapy Assistant            OP Exercises     Row Name 20 0800              "Subjective Comments    Subjective Comments  Mr. South comes to PT this date with slight increased pain; States that he has been doing HEP and doesn't have any issue to report with them;  \"I am just stiff all over and need to exercise\"  -KH         Subjective Pain    Able to rate subjective pain?  yes  -KH      Pre-Treatment Pain Level  5  -KH      Post-Treatment Pain Level  4  -KH         Exercise 1    Exercise Name 1  pro ll LE/UE strength  -KH      Cueing 1  Verbal;Demo  -KH      Time 1  10'  -KH      Additional Comments  level 3; seat 11  -KH         Exercise 2    Exercise Name 2  incline stretch  -KH      Cueing 2  Verbal  -KH      Sets 2  3  -KH      Time 2  30\"  -KH         Exercise 3    Exercise Name 3  standing hamstring stretch  -KH      Cueing 3  Verbal  -KH      Sets 3  2  -KH      Time 3  30\" each B  -KH         Exercise 4    Exercise Name 4  Cybex Leg Press  -KH      Cueing 4  Verbal  -KH      Sets 4  3  -KH      Reps 4  10  -KH      Additional Comments  80# sled 4  -KH         Exercise 5    Exercise Name 5  Cybex hip abd/add  -KH      Cueing 5  Verbal  -KH      Sets 5  3  -KH      Reps 5  10 each  -KH      Additional Comments  45#   -KH         Exercise 6    Exercise Name 6  Wall Planks with hip drivers for low back mobilization  -KH      Sets 6  1  -KH      Reps 6  15 each  -KH      Additional Comments  Triplaner (rotation,lateral,fwd/bkd)  -KH         Exercise 7    Exercise Name 7  sit to stands no UE assist with trunk extension in standing  -KH      Cueing 7  Verbal;Demo  -KH      Sets 7  2  -KH      Reps 7  10  -KH        User Key  (r) = Recorded By, (t) = Taken By, (c) = Cosigned By    Initials Name Provider Type    Dariana Cedeño PTA Physical Therapy Assistant                       PT OP Goals     Row Name 01/09/20 0900          PT Short Term Goals    STG Date to Achieve  01/30/20  -KH     STG 1  Pt to be independent c HEP  -KH     STG 1 Progress  Ongoing  -KH     STG 2  Pt to improve SEBAS by " "10%  -     STG 2 Progress  Progressing  -     STG 3  Pt to improve 5x sit-stand by 5\" speed, improved efficiency/comfort&ease of movement, reduced need for UE support/assist, or reduced pain by 50%.  -     STG 3 Progress  Progressing  -     STG 4  Pt to report/demo 50% improved Sx/function c regards to general mobility tolerance & ability.  -     STG 4 Progress  Progressing  -     STG 5  Pt to successfully transition to self guided fitness center routine  -     STG 5 Progress  Progressing  -        Long Term Goals    LTG 1  deferred  -        Time Calculation    PT Goal Re-Cert Due Date  01/30/20  -       User Key  (r) = Recorded By, (t) = Taken By, (c) = Cosigned By    Initials Name Provider Type    Dariana Cedeño PTA Physical Therapy Assistant                         Time Calculation:   Start Time: 0900  Stop Time: 0938  Time Calculation (min): 38 min  Therapy Charges for Today     Code Description Service Date Service Provider Modifiers Qty    32709825307 HC PT THER PROC EA 15 MIN 1/9/2020 Daraina Shi PTA GP 3                    Dariana Shi PTA  1/9/2020     "

## 2020-01-13 ENCOUNTER — HOSPITAL ENCOUNTER (OUTPATIENT)
Dept: PHYSICAL THERAPY | Facility: HOSPITAL | Age: 65
Setting detail: THERAPIES SERIES
Discharge: HOME | End: 2020-01-13

## 2020-01-13 DIAGNOSIS — M54.41 CHRONIC MIDLINE LOW BACK PAIN WITH BILATERAL SCIATICA: Primary | ICD-10-CM

## 2020-01-13 DIAGNOSIS — M54.42 CHRONIC MIDLINE LOW BACK PAIN WITH BILATERAL SCIATICA: Primary | ICD-10-CM

## 2020-01-13 DIAGNOSIS — G89.29 CHRONIC MIDLINE LOW BACK PAIN WITH BILATERAL SCIATICA: Primary | ICD-10-CM

## 2020-01-13 PROCEDURE — 97110 THERAPEUTIC EXERCISES: CPT

## 2020-01-13 NOTE — THERAPY TREATMENT NOTE
Outpatient Physical Therapy Ortho Treatment Note  [facility]     Patient Name: Jeramy South  : 1955  MRN: [id number]  Today's Date: 2020      Visit Date: 2020     Subjective Improvement: 0%  Attendance: 3/3 (6 visits approved)  Next MD Visit : ??  Recert Date:  2020        Therapy Diagnosis:  Chronic Low Back Pain     Visit Dx:    ICD-10-CM ICD-9-CM   1. Chronic midline low back pain with bilateral sciatica M54.41 724.2    M54.42 724.3    G89.29 338.29       There is no problem list on file for this patient.       No past medical history on file.     Past Surgical History:   Procedure Laterality Date   • CARDIAC CATHETERIZATION      2012 tripple bipass (per pt)       PT Ortho     Row Name 20 0700       Precautions and Contraindications    Precautions  Lumbar Fusion/ORIF L ankle   -       Posture/Observations    Posture/Observations Comments  fwd flexed at trunk in standing and decrease heel toe movement in the L ankle with gait   -      User Key  (r) = Recorded By, (t) = Taken By, (c) = Cosigned By    Initials Name Provider Type    Dariana Cedeño PTA Physical Therapy Assistant                      PT Assessment/Plan     Row Name 20 07          PT Assessment    Assessment Comments  pt with good response to treatment this date; complained mostly of tightness in the left lower back;  increased tone noted on L lumbar paraspinals and encouraged pt to begin heat at home and instructed in stretches for HEP to help assist with that pain.   -        PT Plan    PT Frequency  2x/week  -     Predicted Duration of Therapy Intervention (Therapy Eval)  3-4 weeks  -     PT Plan Comments  3 more approved visits after today; Possible manual to L lumbar paraspinals next; continue with strengthening and mobilization of the spine as able.   -       User Key  (r) = Recorded By, (t) = Taken By, (c) = Cosigned By    Initials Name Provider Type    Dariana Cedeño PTA  "Physical Therapy Assistant            OP Exercises     Row Name 01/13/20 0700             Subjective Comments    Subjective Comments  Mr. South reports to PT with pain about the same; Does report that he felt better after last treatment and had decreased pain throughout that day;   -KH         Subjective Pain    Able to rate subjective pain?  yes  -KH      Pre-Treatment Pain Level  5  -KH      Post-Treatment Pain Level  4  -KH         Total Minutes    67756 - PT Therapeutic Exercise Minutes  48  -KH         Exercise 1    Exercise Name 1  pro ll LE/UE strength  -KH      Cueing 1  Verbal;Demo  -KH      Time 1  10'  -KH      Additional Comments  level 3; seat 11  -KH         Exercise 2    Exercise Name 2  standing hamstring stretch  -KH      Cueing 2  Verbal  -KH      Sets 2  3  -KH      Time 2  30\"  -KH         Exercise 3    Exercise Name 3  incline stretch  -KH      Cueing 3  Verbal  -KH      Sets 3  3  -KH      Time 3  30\"  -KH         Exercise 4    Exercise Name 4  Step Ups with LE ext  -KH      Cueing 4  Verbal  -KH      Sets 4  1  -KH      Reps 4  10 each  -KH      Additional Comments  6\" step   -KH         Exercise 5    Exercise Name 5  Cybex row  -KH      Cueing 5  Verbal  -KH      Sets 5  3  -KH      Reps 5  10  -KH      Additional Comments  40#  -KH         Exercise 6    Exercise Name 6  Cybex Incline Pull  -KH      Cueing 6  Verbal  -KH      Sets 6  3  -KH      Reps 6  10  -KH      Additional Comments  50#  -KH         Exercise 7    Exercise Name 7  Cybex leg press  -KH      Cueing 7  Verbal  -KH      Sets 7  3  -KH      Reps 7  10  -KH      Additional Comments  90#  -KH         Exercise 8    Exercise Name 8  Cybex hip abd/add  -KH      Cueing 8  Verbal  -KH      Additional Comments  45# each   -KH         Exercise 9    Exercise Name 9  seated quadratus stretch L  -KH      Cueing 9  Verbal;Demo  -KH      Sets 9  3  -KH      Time 9  30\"  -KH         Exercise 10    Exercise Name 10  \"truStretch\" Mid Back " "stretch  -      Cueing 10  Verbal  -      Sets 10  1  -      Reps 10  10  -KH         Exercise 11    Exercise Name 11  \"TruStretch-MidBack with R/L lean\"  -KH      Cueing 11  Verbal  -KH      Sets 11  1  -KH      Reps 11  10  -KH        User Key  (r) = Recorded By, (t) = Taken By, (c) = Cosigned By    Initials Name Provider Type    Dariana Cedeño PTA Physical Therapy Assistant                       PT OP Goals     Row Name 01/13/20 0700          PT Short Term Goals    STG Date to Achieve  01/30/20  -     STG 1  Pt to be independent c HEP  -     STG 1 Progress  Ongoing  -     STG 2  Pt to improve SEBAS by 10%  -     STG 2 Progress  Progressing  -     STG 3  Pt to improve 5x sit-stand by 5\" speed, improved efficiency/comfort&ease of movement, reduced need for UE support/assist, or reduced pain by 50%.  -     STG 3 Progress  Progressing  -     STG 4  Pt to report/demo 50% improved Sx/function c regards to general mobility tolerance & ability.  -     STG 4 Progress  Progressing  -     STG 5  Pt to successfully transition to self guided fitness center routine  -     STG 5 Progress  Progressing  -        Long Term Goals    LTG 1  deferred  -        Time Calculation    PT Goal Re-Cert Due Date  01/30/20  -       User Key  (r) = Recorded By, (t) = Taken By, (c) = Cosigned By    Initials Name Provider Type    Dariana Cedeño PTA Physical Therapy Assistant                         Time Calculation:   Start Time: 0717  Stop Time: 0805  Time Calculation (min): 48 min  Total Timed Code Minutes- PT: 48 minute(s)  Therapy Charges for Today     Code Description Service Date Service Provider Modifiers Qty    [id number]  PT THER PROC EA 15 MIN 1/13/2020 Dariana Shi PTA GP 3                    Dariana Shi PTA  1/13/2020     "

## 2020-01-15 ENCOUNTER — HOSPITAL ENCOUNTER (OUTPATIENT)
Dept: PHYSICAL THERAPY | Facility: HOSPITAL | Age: 65
Setting detail: THERAPIES SERIES
Discharge: HOME | End: 2020-01-15

## 2020-01-15 DIAGNOSIS — G89.29 CHRONIC MIDLINE LOW BACK PAIN WITH BILATERAL SCIATICA: Primary | ICD-10-CM

## 2020-01-15 DIAGNOSIS — M54.42 CHRONIC MIDLINE LOW BACK PAIN WITH BILATERAL SCIATICA: Primary | ICD-10-CM

## 2020-01-15 DIAGNOSIS — M54.41 CHRONIC MIDLINE LOW BACK PAIN WITH BILATERAL SCIATICA: Primary | ICD-10-CM

## 2020-01-15 PROCEDURE — 97140 MANUAL THERAPY 1/> REGIONS: CPT

## 2020-01-15 PROCEDURE — 97110 THERAPEUTIC EXERCISES: CPT

## 2020-01-15 NOTE — THERAPY TREATMENT NOTE
Outpatient Physical Therapy Ortho Treatment Note  [facility]     Patient Name: Jeramy South  : 1955  MRN: [id number]  Today's Date: 1/15/2020      Visit Date: 01/15/2020  Pt has attended 4/4 visits  6 visits approved  MD ???  Recert 2020  Visit Dx:    ICD-10-CM ICD-9-CM   1. Chronic midline low back pain with bilateral sciatica M54.41 724.2    M54.42 724.3    G89.29 338.29       There is no problem list on file for this patient.       No past medical history on file.     Past Surgical History:   Procedure Laterality Date   • CARDIAC CATHETERIZATION      2012 tripple bipass (per pt)                       PT Assessment/Plan     Row Name 01/15/20 1623          PT Assessment    Assessment Comments  Pt does well with cybex work--likes machines--cautioned not to do extension machine at any time and avoid extension due to surgical history--tolerates manual work well good tolerance with cupping with multiple sites of increased scar tissue noted.  Declines ice but encouraged to try at home if back is sore from manual work  -SP        PT Plan    PT Frequency  2x/week  -SP     Predicted Duration of Therapy Intervention (Therapy Eval)  3 weeks  -SP     PT Plan Comments  Monitor response with manual work and continue as tolerated--would benefit from further manual work  -SP       User Key  (r) = Recorded By, (t) = Taken By, (c) = Cosigned By    Initials Name Provider Type    Anastasiia Castro PTA Physical Therapy Assistant            OP Exercises     Row Name 01/15/20 0900             Subjective Comments    Subjective Comments  Notes he is still painful  -SP         Subjective Pain    Able to rate subjective pain?  yes  -SP      Pre-Treatment Pain Level  5  -SP      Post-Treatment Pain Level  5  -SP         Exercise 1    Exercise Name 1  Pro 2 Level 2.5  -SP      Time 1  10 min  -SP      Additional Comments  seat 11  -SP         Exercise 2    Exercise Name 2  Incline S  -SP      Reps 2  3  -SP    "   Time 2  30 sec  -SP         Exercise 3    Exercise Name 3  see manual  -SP         Exercise 4    Exercise Name 4  cybex row  -SP      Reps 4  30  -SP      Additional Comments  50#  -SP         Exercise 5    Exercise Name 5  cybex incline pulls  -SP      Reps 5  30  -SP      Additional Comments  60#  -SP         Exercise 6    Exercise Name 6  cybex abdominals  -SP      Reps 6  20  -SP      Additional Comments  50# tactile cues to avoid any extension  -SP        User Key  (r) = Recorded By, (t) = Taken By, (c) = Cosigned By    Initials Name Provider Type    Anastasiia Castro, JOSE Physical Therapy Assistant                      Manual Rx (last 36 hours)      Manual Treatments     Row Name 01/15/20 1500             Manual Rx 1    Manual Rx 1 Location  Alignment check SI level no sidebend noted   -SP      Manual Rx 1 Type  MFR along B paraspinals as well as cross friction to scar and various points along SI followed by cupping to same with light cupping along scar as tolerated  -SP      Manual Rx 1 Duration  28 min  -SP        User Key  (r) = Recorded By, (t) = Taken By, (c) = Cosigned By    Initials Name Provider Type    Anastasiia Castro, JOSE Physical Therapy Assistant          PT OP Goals     Row Name 01/15/20 1628 01/15/20 0900       PT Short Term Goals    STG Date to Achieve  --  01/30/20  -SP    STG 1  --  Pt to be independent c HEP  -SP    STG 1 Progress  --  Ongoing  -SP    STG 2  --  Pt to improve SEBAS by 10%  -SP    STG 2 Progress  --  Progressing  -SP    STG 3  --  Pt to improve 5x sit-stand by 5\" speed, improved efficiency/comfort&ease of movement, reduced need for UE support/assist, or reduced pain by 50%.  -SP    STG 3 Progress  --  Progressing  -SP    STG 4  --  Pt to report/demo 50% improved Sx/function c regards to general mobility tolerance & ability.  -SP    STG 4 Progress  --  Progressing  -SP    STG 5  --  Pt to successfully transition to self guided fitness center routine  -SP    STG 5 " Progress  --  Progressing  -SP       Long Term Goals    LTG 1  --  deferred  -SP       Time Calculation    PT Goal Re-Cert Due Date  01/27/20  -SP  01/30/20  -SP      User Key  (r) = Recorded By, (t) = Taken By, (c) = Cosigned By    Initials Name Provider Type    Anastasiia Castro PTA Physical Therapy Assistant                         Time Calculation:   Start Time: 0935  Stop Time: 1022  Time Calculation (min): 47 min  Total Timed Code Minutes- PT: 47 minute(s)  Therapy Charges for Today     Code Description Service Date Service Provider Modifiers Qty    45965854520 HC PT MANUAL THERAPY EA 15 MIN 1/15/2020 Anastasiia Hoskins PTA GP 2    03851720467 HC PT THER PROC EA 15 MIN 1/15/2020 Anastasiia Hoskins PTA GP 2                    Anastasiia Hoskins PTA  1/15/2020

## 2020-01-20 ENCOUNTER — HOSPITAL ENCOUNTER (OUTPATIENT)
Dept: PHYSICAL THERAPY | Facility: HOSPITAL | Age: 65
Setting detail: THERAPIES SERIES
Discharge: HOME | End: 2020-01-20

## 2020-01-20 DIAGNOSIS — M54.41 CHRONIC MIDLINE LOW BACK PAIN WITH BILATERAL SCIATICA: Primary | ICD-10-CM

## 2020-01-20 DIAGNOSIS — G89.29 CHRONIC MIDLINE LOW BACK PAIN WITH BILATERAL SCIATICA: Primary | ICD-10-CM

## 2020-01-20 DIAGNOSIS — M54.42 CHRONIC MIDLINE LOW BACK PAIN WITH BILATERAL SCIATICA: Primary | ICD-10-CM

## 2020-01-20 PROCEDURE — 97140 MANUAL THERAPY 1/> REGIONS: CPT

## 2020-01-20 NOTE — THERAPY TREATMENT NOTE
Outpatient Physical Therapy Ortho Treatment Note  [facility]     Patient Name: Jeramy South  : 1955  MRN: [id number]  Today's Date: 2020      Visit Date: 2020      Subjective Improvement: 0%  Attendance: 5/5 (eval + 6 visits approved)  Next MD Visit : ??  Recert Date:  2020        Therapy Diagnosis:  Chronic Low Back Pain   Visit Dx:    ICD-10-CM ICD-9-CM   1. Chronic midline low back pain with bilateral sciatica M54.41 724.2    M54.42 724.3    G89.29 338.29       There is no problem list on file for this patient.       No past medical history on file.     Past Surgical History:   Procedure Laterality Date   • CARDIAC CATHETERIZATION      2012 tripple bipass (per pt)       PT Ortho     Row Name 20 0700       Precautions and Contraindications    Precautions  Lumbar Fusion/ORIF L ankle   -       Posture/Observations    Posture/Observations Comments  fwd flexed at trunk in standing and decrease heel toe movement in the L ankle with gait; slow gait; small scab on distal end of lumbar fusion scar  -      User Key  (r) = Recorded By, (t) = Taken By, (c) = Cosigned By    Initials Name Provider Type    Dariana Cedeño PTA Physical Therapy Assistant                      PT Assessment/Plan     Row Name 20 07          PT Assessment    Assessment Comments  no progression in strengthening this date; only manual at pt request secondary to pain in his tooth.; Responds well to treatment with IASTM; good scar mobility noted. Decrease tissue mobility midback  -        PT Plan    PT Frequency  2x/week  -     Predicted Duration of Therapy Intervention (Therapy Eval)  3-4 weeks  -     PT Plan Comments  Continue with manual and strengthening as able. Progress as pain allows with abscess; 2 more approved visits after today;   -BRISSA       User Key  (r) = Recorded By, (t) = Taken By, (c) = Cosigned By    Initials Name Provider Type    Dariana Cedeño PTA Physical Therapy  "Assistant            OP Exercises     Row Name 01/20/20 0700             Subjective Comments    Subjective Comments  Pt reports that he has been in bed all weekend secondary to a tooth abscess; been on antibiotics for 3 days but not helping; wants to do the manual only with PT today so he can get to the dentist  -         Subjective Pain    Able to rate subjective pain?  yes  -      Pre-Treatment Pain Level  4  -KH      Post-Treatment Pain Level  4  -         Total Minutes    60529 - PT Manual Therapy Minutes  14  -KH         Exercise 1    Exercise Name 1  Manual: IASTM to lumbar/thoracic paraspinals  -      Time 1  14'  -KH        User Key  (r) = Recorded By, (t) = Taken By, (c) = Cosigned By    Initials Name Provider Type    Dariana Cedeño PTA Physical Therapy Assistant                      Manual Rx (last 36 hours)      Manual Treatments     Row Name 01/20/20 0700             Total Minutes    06291 - PT Manual Therapy Minutes  14  -KH        User Key  (r) = Recorded By, (t) = Taken By, (c) = Cosigned By    Initials Name Provider Type    Dariana Cedeño PTA Physical Therapy Assistant          PT OP Goals     Row Name 01/20/20 0700          PT Short Term Goals    STG Date to Achieve  01/30/20  -     STG 1  Pt to be independent c HEP  -     STG 1 Progress  Ongoing  -     STG 2  Pt to improve SEBAS by 10%  -     STG 2 Progress  Progressing  -     STG 3  Pt to improve 5x sit-stand by 5\" speed, improved efficiency/comfort&ease of movement, reduced need for UE support/assist, or reduced pain by 50%.  -     STG 3 Progress  Progressing  -     STG 4  Pt to report/demo 50% improved Sx/function c regards to general mobility tolerance & ability.  -     STG 4 Progress  Progressing  -     STG 5  Pt to successfully transition to self guided fitness center routine  -     STG 5 Progress  Progressing  -        Long Term Goals    LTG 1  deferred  -        Time Calculation    PT Goal Re-Cert Due Date  " 01/30/20  -BRISSA       User Key  (r) = Recorded By, (t) = Taken By, (c) = Cosigned By    Initials Name Provider Type    Dariana Cedeño PTA Physical Therapy Assistant                         Time Calculation:   Start Time: 0720  Stop Time: 0734  Time Calculation (min): 14 min  Total Timed Code Minutes- PT: 14 minute(s)  Therapy Charges for Today     Code Description Service Date Service Provider Modifiers Qty    53631655094 HC PT MANUAL THERAPY EA 15 MIN 1/20/2020 Dariana Shi PTA GP 1                    Dariana Shi PTA  1/20/2020

## 2020-01-22 ENCOUNTER — APPOINTMENT (OUTPATIENT)
Dept: PHYSICAL THERAPY | Facility: HOSPITAL | Age: 65
End: 2020-01-22

## 2020-01-27 ENCOUNTER — HOSPITAL ENCOUNTER (OUTPATIENT)
Dept: PHYSICAL THERAPY | Facility: HOSPITAL | Age: 65
Setting detail: THERAPIES SERIES
Discharge: HOME | End: 2020-01-27

## 2020-01-27 DIAGNOSIS — M54.42 CHRONIC MIDLINE LOW BACK PAIN WITH BILATERAL SCIATICA: Primary | ICD-10-CM

## 2020-01-27 DIAGNOSIS — M54.41 CHRONIC MIDLINE LOW BACK PAIN WITH BILATERAL SCIATICA: Primary | ICD-10-CM

## 2020-01-27 DIAGNOSIS — G89.29 CHRONIC MIDLINE LOW BACK PAIN WITH BILATERAL SCIATICA: Primary | ICD-10-CM

## 2020-01-27 PROCEDURE — 97110 THERAPEUTIC EXERCISES: CPT | Performed by: PHYSICAL THERAPIST

## 2020-01-27 NOTE — THERAPY TREATMENT NOTE
Outpatient Physical Therapy Ortho Progress Note  [facility]     Patient Name: Jeramy South  : 1955  MRN: [id number]  Today's Date: 2020      Visit Date: 2020  Subjective Improvement: 25%  Attendance:  (eval + 6 visits approved)  Next MD Visit : not scheduled  Recert Date:  2020        Therapy Diagnosis:  Chronic Low Back Pain   Visit Dx:    ICD-10-CM ICD-9-CM   1. Chronic midline low back pain with bilateral sciatica M54.41 724.2    M54.42 724.3    G89.29 338.29       There is no problem list on file for this patient.       No past medical history on file.     Past Surgical History:   Procedure Laterality Date   • CARDIAC CATHETERIZATION      2012 tripple bipass (per pt)       PT Ortho     Row Name 20 0800       Posture/Observations    Posture/Observations Comments  antalgic gait due to left ankle injury, forward flexed trunk   -SW       General ROM    GENERAL ROM COMMENTS  trunk flexion requires hands on thighs, extension 0, sidebending B 10 degrees  -SW       MMT (Manual Muscle Testing)    General MMT Comments  able to perfom bridge 25% through range, sit to stand requires UE support  -SW      User Key  (r) = Recorded By, (t) = Taken By, (c) = Cosigned By    Initials Name Provider Type    Roselia Bello Physical Therapist                      PT Assessment/Plan     Row Name 20 0800          PT Assessment    Functional Limitations  Decreased safety during functional activities;Impaired gait;Limitation in home management;Limitations in community activities;Limitations in functional capacity and performance;Performance in leisure activities;Performance in self-care ADL  -SW     Impairments  Balance;Gait;Endurance;Impaired flexibility;Impaired muscle endurance;Joint integrity;Joint mobility;Muscle strength;Pain;Poor body mechanics;Posture;Range of motion;Sensation  -SW     Assessment Comments  Patient cut 5x sit to stand time in half; however, he digressed in  "modified Oswestry score. He exhbiits difficulty moving through trunk ROM requiring hands on thighs to forward bend. In addition he exhibits marked weakness in core and hips.   -        PT Plan    PT Frequency  2x/week  -SW     Predicted Duration of Therapy Intervention (Therapy Eval)  3 weeks  -     PT Plan Comments  Develop short mat, weight machine and aquatic program that patient ca easily reproduce as continues independently in fitness program.   -       User Key  (r) = Recorded By, (t) = Taken By, (c) = Cosigned By    Initials Name Provider Type    Roselia Bello Physical Therapist            OP Exercises     Row Name 01/27/20 0800             Subjective Comments    Subjective Comments  Patient reports that back pain is contstant.   -SW         Subjective Pain    Able to rate subjective pain?  yes  -      Pre-Treatment Pain Level  4  -SW         Exercise 1    Exercise Name 1  Pro II L3  -SW      Reps 1  10'  -SW         Exercise 2    Exercise Name 2  lower trunk rotation  -SW      Reps 2  10  -SW         Exercise 3    Exercise Name 3  supine marching  -SW      Reps 3  20  -SW         Exercise 4    Exercise Name 4  bridging  -SW      Reps 4  20  -SW         Exercise 5    Exercise Name 5  sit to stand  -SW      Reps 5  20  -SW        User Key  (r) = Recorded By, (t) = Taken By, (c) = Cosigned By    Initials Name Provider Type    Roselia Bello Physical Therapist                       PT OP Goals     Row Name 01/27/20 0800          PT Short Term Goals    STG Date to Achieve  01/30/20  -     STG 1  Pt to be independent c HEP  -SW     STG 1 Progress  Ongoing  -     STG 2  Pt to improve SEBAS by 10%  -     STG 2 Progress  Progressing;Not Met;Ongoing  -     STG 3  Pt to improve 5x sit-stand by 5\" speed, improved efficiency/comfort&ease of movement, reduced need for UE support/assist, or reduced pain by 50%.  -     STG 3 Progress  Met  -     STG 4  Pt to report/demo 50% improved Sx/function c " regards to general mobility tolerance & ability.  -     STG 4 Progress  Not Met  -     STG 5  Pt to successfully transition to self guided fitness center routine  -     STG 5 Progress  Progressing  -        Long Term Goals    LTG 1  deferred  -        Time Calculation    PT Goal Re-Cert Due Date  02/17/20  -       User Key  (r) = Recorded By, (t) = Taken By, (c) = Cosigned By    Initials Name Provider Type    Roselia Bello Physical Therapist          Therapy Education  Education Details: HEP per flowsheet  Given: HEP  Program: New  How Provided: Verbal, Demonstration, Written  Provided to: Patient  Level of Understanding: Verbalized, Demonstrated    Outcome Measure Options: Modifed O[place]ry  5 Times Sit to Stand  5 Times Sit to Stand (seconds): 25 seconds  5 Times Sit to Stand Comments: B use of UE's, minimal labored movement  Modified Oswestry  Modified Oswestry Score/Comments: 68% disability      Time Calculation:   Start Time: 0845  Stop Time: 0925  Time Calculation (min): 40 min  Therapy Charges for Today     Code Description Service Date Service Provider Modifiers Qty    24951104825 HC PT THER PROC EA 15 MIN 1/27/2020 Roselia Galarza GP 3          PT G-Codes  Outcome Measure Options: Modifed O[place]ry  Modified Oswestry Score/Comments: 68% disability         Roselia Galarza  1/27/2020

## 2021-09-30 DIAGNOSIS — R07.9 CHEST PAIN, UNSPECIFIED TYPE: Primary | ICD-10-CM

## 2021-10-14 ENCOUNTER — HOSPITAL ENCOUNTER (OUTPATIENT)
Dept: CARDIOLOGY | Facility: HOSPITAL | Age: 66
Discharge: HOME | End: 2021-10-14

## 2021-10-14 ENCOUNTER — HOSPITAL ENCOUNTER (OUTPATIENT)
Dept: NUCLEAR MEDICINE | Facility: HOSPITAL | Age: 66
Discharge: HOME | End: 2021-10-14

## 2021-10-14 DIAGNOSIS — I25.710 ATHEROSCLEROSIS OF AUTOLOGOUS VEIN CORONARY ARTERY BYPASS GRAFT WITH UNSTABLE ANGINA PECTORIS (HCC): ICD-10-CM

## 2021-10-14 DIAGNOSIS — I25.10 ATHEROSCLEROSIS OF NATIVE CORONARY ARTERY OF NATIVE HEART WITHOUT ANGINA PECTORIS: ICD-10-CM

## 2021-10-14 DIAGNOSIS — R07.9 CHEST PAIN, UNSPECIFIED TYPE: ICD-10-CM

## 2021-10-14 PROCEDURE — 0 TECHNETIUM SESTAMIBI: Performed by: INTERNAL MEDICINE

## 2021-10-14 PROCEDURE — 93017 CV STRESS TEST TRACING ONLY: CPT

## 2021-10-14 PROCEDURE — A9500 TC99M SESTAMIBI: HCPCS | Performed by: INTERNAL MEDICINE

## 2021-10-14 PROCEDURE — 78452 HT MUSCLE IMAGE SPECT MULT: CPT

## 2021-10-14 PROCEDURE — 25010000002 REGADENOSON 0.4 MG/5ML SOLUTION: Performed by: INTERNAL MEDICINE

## 2021-10-14 RX ORDER — FENOFIBRATE 145 MG/1
145 TABLET, COATED ORAL DAILY
COMMUNITY

## 2021-10-14 RX ORDER — ATORVASTATIN CALCIUM 80 MG/1
80 TABLET, FILM COATED ORAL DAILY
COMMUNITY

## 2021-10-14 RX ORDER — ASPIRIN 81 MG/1
81 TABLET, CHEWABLE ORAL DAILY
COMMUNITY

## 2021-10-14 RX ORDER — ISOSORBIDE DINITRATE 30 MG/1
30 TABLET ORAL DAILY
COMMUNITY

## 2021-10-14 RX ORDER — MULTIVIT WITH MINERALS/LUTEIN
500 TABLET ORAL DAILY
COMMUNITY

## 2021-10-14 RX ORDER — METOPROLOL SUCCINATE 50 MG/1
50 TABLET, EXTENDED RELEASE ORAL DAILY
COMMUNITY

## 2021-10-14 RX ORDER — OXYCODONE AND ACETAMINOPHEN 10; 325 MG/1; MG/1
1 TABLET ORAL EVERY 6 HOURS PRN
COMMUNITY

## 2021-10-14 RX ORDER — GLIMEPIRIDE 2 MG/1
2 TABLET ORAL 2 TIMES DAILY
COMMUNITY

## 2021-10-14 RX ORDER — SODIUM CHLORIDE 0.9 % (FLUSH) 0.9 %
10 SYRINGE (ML) INJECTION ONCE
Status: COMPLETED | OUTPATIENT
Start: 2021-10-14 | End: 2021-10-14

## 2021-10-14 RX ORDER — CELECOXIB 200 MG/1
200 CAPSULE ORAL DAILY
COMMUNITY

## 2021-10-14 RX ADMIN — TECHNETIUM TC 99M SESTAMIBI 1 DOSE: 1 INJECTION INTRAVENOUS at 08:35

## 2021-10-14 RX ADMIN — TECHNETIUM TC 99M SESTAMIBI 1 DOSE: 1 INJECTION INTRAVENOUS at 07:33

## 2021-10-14 RX ADMIN — SODIUM CHLORIDE, PRESERVATIVE FREE 10 ML: 5 INJECTION INTRAVENOUS at 08:35

## 2021-10-14 RX ADMIN — REGADENOSON 0.4 MG: 0.08 INJECTION, SOLUTION INTRAVENOUS at 08:35

## 2021-10-14 NOTE — H&P
Cardiology History and Physical Note        Patient Name: Jeramy South  Age/Sex: 66 y.o. male  : 1955  MRN: [id number]    Date of Admission : 10/14/2021    Primary care Physician:  Jessica Woods M.D.    Reason for Admission: Lexiscan Cardiolite stress test history of atherosclerotic coronary artery disease with previous coronary artery bypass grafting      Subjective:       Chief Complaint: Chest pain.    History of Present Illness:  Jeramy South is a 66 y.o. male    Height of 5 feet 10 inches weight of 212 pounds with a body mass index of 30 with a past medical history significant for atherosclerotic coronary artery disease status post coronary angiogram done in  which had revealed diffuse triple-vessel coronary artery disease with subsequent coronary artery bypass grafting done by Dr. Luiz Saldivar with LIMA to the LAD, saphenous vein graft to the ramus intermediate branch, saphenous vein graft to the right coronary artery, history of arterial hypertension, hypertensive heart disease, hyperlipidemia, non-insulin-dependent diabetes, obesity, chronic back pain, history of syncope, history of spinal stenosis, gastroesophageal reflux disease, degenerative disc disease    Patient was recently evaluated and was found to have cholelithiasis and is to undergo cholecystectomy.    Patient had symptoms of chest tightness and chest discomfort.  Due to the patient's symptom complex patient had decided to follow-up with cardiology in [place].    Patient has had previous cardiac follow-up with Duarte Bermudez MD in [place].    Patient had a loop recorder placed which was recently returned for symptoms of lightheaded dizziness.  Patient has been followed by endocrinology for the diabetes.    Patient on questioning denies any symptoms of palpitations though patient resting electrocardiogram does reveal evidence of premature ventricular complex.  Patient does consume Mountain Dew coffee and  Anthony.    Patient on questioning has had symptoms of lightheaded dizziness in the past.    Patient description of the discomfort is of different quality than the one which she had experienced prior to the coronary artery bypass grafting.    On further questioning patient denies any fever chill productive cough.  Patient denies any hemoptysis hematuria bright of blood per rectum.  Patient denies any lower extremity edema.    Patient blood pressure 130/62 pulse of 51 respiration of 18 oxygen saturation of 97%.    Patient resting electrocardiogram done reveals sinus bradycardia at the rate of 51 bpm with first-degree AV block with nonspecific ST-T wave changes with isolated premature ventricular complex.    Patient 10 point review of system except for stated in the history of present is negative.      Concurrent  Medical History:  1.  Chest pain.  2.  Atherosclerotic coronary artery disease.  3.  Coronary angiogram done in 2012 had revealed:  A.  Diffuse disease in the left anterior descending artery of 70 to 80%.  B.  Proximal circumflex artery with 70 to 80% stenosis.  C.  70% stenosis in the right coronary artery.  D.  Preserved left ventricular systolic function with an ejection fraction of 55%.  4.  Coronary artery bypass grafting done by Dr. Luiz Saldivar with:  A.  LIMA to the LAD.  B.  Saphenous vein graft to the ramus intermedius branch.  C.  Saphenous vein graft to the right coronary artery.  5.  Arterial hypertension.  6.  Hypertensive heart disease.  7.  Nonrheumatic mild mitral and nonrheumatic mild tricuspid regurgitation.  8.  Hyperlipidemia.  9.  Diabetes.  10.  Obesity with a body mass index of 30.  11.  Degenerative disc disease  12.  Gastroesophageal reflux disease.  13.  Cholelithiasis.  14.  Chronic back pain s/p back surgery.      Past Surgical History:  1.  Coronary angiogram 2012.  2.  Coronary artery bypass grafting done in September 2012 by Dr. Luiz Saldivar with:  A.  LIMA to the LAD.  B.   Saphenous vein graft to the ramus intermedius branch.  C.  Saphenous vein graft to the right coronary artery.  3.  C4-C5 cervical neck surgery.  4.  Ankle surgery x2.  5.  Carpal tunnel release.  6.  Foot surgery.  7.  Rotator cuff repair.  8.  Colonoscopy.  9.  Tonsillectomy.  10.  Bunionectomy.  11.  Lower back L4-L5 surgery.          Family History: No history of premature atherosclerotic coronary artery disease      Social History: Social alcohol intake quit smoking in the remote past patient is a retired        Cardiac Risk factor:   1.  Male.  2.  Arterial hypertension.  3.  Hyperlipidemia.  4.  Diabetes.  5.  Previous history of tobacco abuse    Allergies:  No Known Allergies    Home Medication::  Prior to Admission medications    Not on File         Review of Systems   Constitutional: Positive for fatigue. Negative for chills, fever and unexpected weight change.   HENT: Negative for hearing loss and nosebleeds.    Eyes: Negative for visual disturbance.   Respiratory: Positive for chest tightness and shortness of breath. Negative for cough and wheezing.    Cardiovascular: Positive for chest pain. Negative for palpitations and leg swelling.   Gastrointestinal: Negative for abdominal pain, blood in stool, constipation, diarrhea, nausea and vomiting.   Endocrine: Negative for cold intolerance, heat intolerance, polydipsia, polyphagia and polyuria.   Genitourinary: Negative for hematuria.   Musculoskeletal: Negative for joint swelling, myalgias and neck pain.   Skin: Negative for color change, rash and wound.   Neurological: Positive for dizziness. Negative for seizures, syncope, light-headedness, numbness and headaches.   Hematological: Does not bruise/bleed easily.         Objective:     There were no vitals taken for this visit.  Blood pressure 130/62 pulse of 51 respiration of 18 oxygen saturation of 97%  Constitutional:       Appearance: Well-developed.   Eyes:      General: No scleral icterus.      Conjunctiva/sclera: Conjunctivae normal.      Pupils: Pupils are equal, round, and reactive to light.   HENT:      Head: Normocephalic and atraumatic.      Left Ear: External ear normal.      Nose: Nose normal.   Neck:      Thyroid: No thyromegaly.      Vascular: No JVD.      Trachea: No tracheal deviation.      Lymphadenopathy: No cervical adenopathy.   Pulmonary:      Breath sounds: Normal breath sounds. No stridor.   Cardiovascular:      Normal rate. Regular rhythm.   Abdominal:      General: Bowel sounds are normal.   Musculoskeletal: Normal range of motion.      Cervical back: Normal range of motion and neck supple. Skin:     General: Skin is warm and dry.   Neurological:      Mental Status: Alert and oriented to person, place, and time.      Deep Tendon Reflexes: Reflexes are normal and symmetric.   Psychiatric:         Behavior: Behavior normal.         Thought Content: Thought content normal.         Judgment: Judgment normal.         Lab Review:           Invalid input(s): LABALBU, PROT                                    EKG:   ECG/EMG Results (last 24 hours)     ** No results found for the last 24 hours. **          Imaging:  Imaging Results (Last 24 Hours)     ** No results found for the last 24 hours. **          I personally viewed and interpreted the patient's EKG/Telemetry data.    Assessment:   1.  Chest pain.  2.  Atherosclerotic coronary artery disease.  3.  Abnormal resting electrocardiogram with sinus bradycardia first-degree AV block with premature ventricular complex.  4.  Arterial hypertension.  5.  Hypertensive heart disease.  6.  Diabetes.  7.  Obesity.          Plan:   1.  Chest pain.  Patient resting electrocardiogram done reveals sinus bradycardia with a first-degree AV block with premature ventricular complex.  Patient does have history of documented atherosclerotic coronary artery disease with coronary artery bypass grafting done in 2012 with three-vessel bypass grafting done in  [place].  Patient at the present time has atypical symptoms with a resting electrocardiogram which revealed premature ventricular complex.  Patient at the present time has been recommended to undergo myocardial perfusion scan.  Due to the inability to walk on a regular treadmill patient would undergo a Lexiscan Cardiolite stress test.  Risk-benefit treatment option for the Lexiscan Cardiolite stress test were discussed with the patient and an informed consent was obtained.    Myocardial perfusion scintigraphy (MPS)  was recommended to the patient as the best non-invasive modality for the assessment of obstructive CAD.  I  did spend some time discussing the procedure, as well as risks and benefits.  I also informed the patient that the risk of nonfatal MI or major cardiac complication is about  0.02%. The patient was also informed about the risks and benefits of MPS. Patient was also provided with a handout describing the test, as well as patient instructions prior to testing.      I also discussed the results of MPS as divided into risks.The NPV of this test is as high as 98%.  I also specifically discussed the risk of cardiac death with the following percentages:    Low-risk MPS:                          0.5% per year  Mildly abnormal MPS:              2.7%  Moderately abnormal:             2.9%  Severely abnormal:                 4.2%    Plan was to proceed with a myocardial perfusion scan on 10/14/2021.    2.  Arterial hypertension.  Patient blood pressure is currently well controlled 130/62.  Patient has been recommended to continue with the present dose of the metoprolol.  Review of the record indicate patient is currently not on any ACE inhibitor or angiotensin receptor blocker.  Patient is diabetic.  Patient blood pressure would be evaluated and patient may be considered to be put on a ACE inhibitor or angiotensin receptor blocker for renal protection in view of the patient history of diabetes.  Patient  has been counseled to decrease her salt intake.    3.  Hypertensive heart disease with nonrheumatic mitral and tricuspid regurgitation.  Clinically at the present time patient is not in congestive heart failure.  Patient does have isolated premature ventricular complex with a first-degree AV block.  Patient is to undergo transthoracic echocardiogram to evaluate the left ventricular systolic function.    4.  Hyperlipidemia.  Patient has been counseled on low-fat low-cholesterol diet and to continue with the present dose of the Lipitor 80 mg at bedtime.  Patient is to continue with the present dose of the fenofibrate.    5.  Obesity with a body mass index of 30.  Patient has been counseled on weight reduction lifestyle modification and dietary restriction.    6.  Abnormal resting electrocardiogram with sinus bradycardia with a first-degree AV block with premature ventricular complex.  Patient has not complained of having any symptoms of palpitation.  Patient had a loop recorder placement.  Patient has been recommended to continue with the present dose of the metoprolol.  Patient has been counseled to abstain from Mountain Dew and caffeinated beverage and coffee.  Patient would be started on magnesium oxide 400 mg twice a day and continued on the present dose of the metoprolol.  Would obtain the report of the loop recorder.    7.  Non-insulin-dependent diabetes.  Patient has been followed by endocrinology.  Patient is on Amaryl and Metformin.  Patient was counseled on American diabetic [employer] diet.    8.  Chronic back pain.  Patient has back surgery along with neck surgery.  Patient is on Celebrex 200 mg daily.    9.  Gastroesophageal reflux disease is noted.    10.  History of cholelithiasis.  Patient is being evaluated for consideration for cholecystectomy.    The above plan of management were discussed with the patient        Time: time spent in face-to-face evaluation of greater than 55 minutes and interacting  and formulating examining and discussing the plan with the patient with 50% of greater time spent in face-to-face interaction.    Electronically signed by Duglas Armstrong MD, 10/14/21, 7:11 AM CDT.    Dictated utilizing Dragon dictation.     Dictated utilizing Dragon dictation.

## 2021-10-16 LAB
BH CV REST NUCLEAR ISOTOPE DOSE: 10.9 MCI
BH CV STRESS BP STAGE 1: NORMAL
BH CV STRESS COMMENTS STAGE 1: NORMAL
BH CV STRESS DOSE REGADENOSON STAGE 1: 0.4
BH CV STRESS DURATION MIN STAGE 1: 0
BH CV STRESS DURATION SEC STAGE 1: 10
BH CV STRESS HR STAGE 1: 74
BH CV STRESS NUCLEAR ISOTOPE DOSE: 33.3 MCI
BH CV STRESS PROTOCOL 1: NORMAL
BH CV STRESS RECOVERY BP: NORMAL MMHG
BH CV STRESS RECOVERY HR: 72 BPM
BH CV STRESS STAGE 1: 1
LV EF NUC BP: 40 %
MAXIMAL PREDICTED HEART RATE: 152 BPM
PERCENT MAX PREDICTED HR: 52.63 %
STRESS BASELINE BP: NORMAL MMHG
STRESS BASELINE HR: 65 BPM
STRESS PERCENT HR: 62 %
STRESS POST ESTIMATED WORKLOAD: 1 METS
STRESS POST PEAK BP: NORMAL MMHG
STRESS POST PEAK HR: 80 BPM
STRESS TARGET HR: 129 BPM

## 2021-10-20 ENCOUNTER — PREP FOR SURGERY (OUTPATIENT)
Dept: OTHER | Facility: HOSPITAL | Age: 66
End: 2021-10-20

## 2021-10-20 DIAGNOSIS — R94.31 NONSPECIFIC ABNORMAL ELECTROCARDIOGRAM (ECG) (EKG): Primary | ICD-10-CM

## 2021-10-20 RX ORDER — SODIUM CHLORIDE 0.9 % (FLUSH) 0.9 %
3 SYRINGE (ML) INJECTION EVERY 12 HOURS SCHEDULED
Status: CANCELLED | OUTPATIENT
Start: 2021-10-25

## 2021-10-20 RX ORDER — SODIUM CHLORIDE 9 MG/ML
75 INJECTION, SOLUTION INTRAVENOUS CONTINUOUS
Status: CANCELLED | OUTPATIENT
Start: 2021-10-25

## 2021-10-20 RX ORDER — SODIUM CHLORIDE 0.9 % (FLUSH) 0.9 %
10 SYRINGE (ML) INJECTION AS NEEDED
Status: CANCELLED | OUTPATIENT
Start: 2021-10-25

## 2021-10-22 ENCOUNTER — LAB (OUTPATIENT)
Dept: LAB | Facility: HOSPITAL | Age: 66
End: 2021-10-22

## 2021-10-22 DIAGNOSIS — R94.31 NONSPECIFIC ABNORMAL ELECTROCARDIOGRAM (ECG) (EKG): ICD-10-CM

## 2021-10-22 LAB — SARS-COV-2 N GENE RESP QL NAA+PROBE: NOT DETECTED

## 2021-10-22 PROCEDURE — C9803 HOPD COVID-19 SPEC COLLECT: HCPCS

## 2021-10-22 PROCEDURE — 87635 SARS-COV-2 COVID-19 AMP PRB: CPT

## 2021-10-25 ENCOUNTER — HOSPITAL ENCOUNTER (OUTPATIENT)
Facility: HOSPITAL | Age: 66
Setting detail: HOSPITAL OUTPATIENT SURGERY
Discharge: HOME | End: 2021-10-25
Attending: INTERNAL MEDICINE | Admitting: INTERNAL MEDICINE

## 2021-10-25 VITALS
RESPIRATION RATE: 18 BRPM | DIASTOLIC BLOOD PRESSURE: 68 MMHG | OXYGEN SATURATION: 95 % | BODY MASS INDEX: 31.67 KG/M2 | WEIGHT: 209 LBS | HEIGHT: 68 IN | SYSTOLIC BLOOD PRESSURE: 130 MMHG | HEART RATE: 71 BPM | TEMPERATURE: 97 F

## 2021-10-25 DIAGNOSIS — R94.31 NONSPECIFIC ABNORMAL ELECTROCARDIOGRAM (ECG) (EKG): ICD-10-CM

## 2021-10-25 LAB
ANION GAP SERPL CALCULATED.3IONS-SCNC: 8 MMOL/L (ref 5–15)
BASOPHILS # BLD AUTO: 0.1 10*3/MM3 (ref 0–0.2)
BASOPHILS NFR BLD AUTO: 1.5 % (ref 0–1.5)
BUN SERPL-MCNC: 23 MG/DL (ref 8–23)
BUN/CREAT SERPL: 25.6 (ref 7–25)
CALCIUM SPEC-SCNC: 9.5 MG/DL (ref 8.6–10.5)
CHLORIDE SERPL-SCNC: 103 MMOL/L (ref 98–107)
CO2 SERPL-SCNC: 25 MMOL/L (ref 22–29)
CREAT SERPL-MCNC: 0.9 MG/DL (ref 0.76–1.27)
DEPRECATED RDW RBC AUTO: 46.4 FL (ref 37–54)
EOSINOPHIL # BLD AUTO: 0.22 10*3/MM3 (ref 0–0.4)
EOSINOPHIL NFR BLD AUTO: 3.4 % (ref 0.3–6.2)
ERYTHROCYTE [DISTWIDTH] IN BLOOD BY AUTOMATED COUNT: 13.2 % (ref 12.3–15.4)
GFR SERPL CREATININE-BSD FRML MDRD: 102 ML/MIN/1.73
GLUCOSE SERPL-MCNC: 145 MG/DL (ref 65–99)
HCT VFR BLD AUTO: 41.5 % (ref 37.5–51)
HGB BLD-MCNC: 13.8 G/DL (ref 13–17.7)
IMM GRANULOCYTES # BLD AUTO: 0.02 10*3/MM3 (ref 0–0.05)
IMM GRANULOCYTES NFR BLD AUTO: 0.3 % (ref 0–0.5)
INR PPP: 1.15 (ref 0.8–1.2)
LYMPHOCYTES # BLD AUTO: 1.89 10*3/MM3 (ref 0.7–3.1)
LYMPHOCYTES NFR BLD AUTO: 29.2 % (ref 19.6–45.3)
MCH RBC QN AUTO: 31.4 PG (ref 26.6–33)
MCHC RBC AUTO-ENTMCNC: 33.3 G/DL (ref 31.5–35.7)
MCV RBC AUTO: 94.3 FL (ref 79–97)
MONOCYTES # BLD AUTO: 0.75 10*3/MM3 (ref 0.1–0.9)
MONOCYTES NFR BLD AUTO: 11.6 % (ref 5–12)
NEUTROPHILS NFR BLD AUTO: 3.49 10*3/MM3 (ref 1.7–7)
NEUTROPHILS NFR BLD AUTO: 54 % (ref 42.7–76)
NRBC BLD AUTO-RTO: 0 /100 WBC (ref 0–0.2)
PLATELET # BLD AUTO: 188 10*3/MM3 (ref 140–450)
PMV BLD AUTO: 12.2 FL (ref 6–12)
POTASSIUM SERPL-SCNC: 4.1 MMOL/L (ref 3.5–5.2)
PROTHROMBIN TIME: 14.6 SECONDS (ref 11.1–15.3)
RBC # BLD AUTO: 4.4 10*6/MM3 (ref 4.14–5.8)
SODIUM SERPL-SCNC: 136 MMOL/L (ref 136–145)
WBC # BLD AUTO: 6.47 10*3/MM3 (ref 3.4–10.8)

## 2021-10-25 PROCEDURE — 25010000002 ONDANSETRON PER 1 MG: Performed by: INTERNAL MEDICINE

## 2021-10-25 PROCEDURE — C1769 GUIDE WIRE: HCPCS | Performed by: INTERNAL MEDICINE

## 2021-10-25 PROCEDURE — 25010000002 FENTANYL CITRATE (PF) 50 MCG/ML SOLUTION: Performed by: INTERNAL MEDICINE

## 2021-10-25 PROCEDURE — C1894 INTRO/SHEATH, NON-LASER: HCPCS | Performed by: INTERNAL MEDICINE

## 2021-10-25 PROCEDURE — 85610 PROTHROMBIN TIME: CPT | Performed by: INTERNAL MEDICINE

## 2021-10-25 PROCEDURE — 25010000002 HEPARIN (PORCINE) PER 1000 UNITS: Performed by: INTERNAL MEDICINE

## 2021-10-25 PROCEDURE — 80048 BASIC METABOLIC PNL TOTAL CA: CPT | Performed by: INTERNAL MEDICINE

## 2021-10-25 PROCEDURE — 0 IOPAMIDOL PER 1 ML: Performed by: INTERNAL MEDICINE

## 2021-10-25 PROCEDURE — 99153 MOD SED SAME PHYS/QHP EA: CPT | Performed by: INTERNAL MEDICINE

## 2021-10-25 PROCEDURE — C1760 CLOSURE DEV, VASC: HCPCS | Performed by: INTERNAL MEDICINE

## 2021-10-25 PROCEDURE — 93459 L HRT ART/GRFT ANGIO: CPT | Performed by: INTERNAL MEDICINE

## 2021-10-25 PROCEDURE — 85025 COMPLETE CBC W/AUTO DIFF WBC: CPT | Performed by: INTERNAL MEDICINE

## 2021-10-25 PROCEDURE — 25010000002 HYDROCORTISONE SODIUM SUCCINATE 100 MG RECONSTITUTED SOLUTION: Performed by: INTERNAL MEDICINE

## 2021-10-25 PROCEDURE — 99152 MOD SED SAME PHYS/QHP 5/>YRS: CPT | Performed by: INTERNAL MEDICINE

## 2021-10-25 PROCEDURE — 25010000002 MIDAZOLAM PER 1 MG: Performed by: INTERNAL MEDICINE

## 2021-10-25 RX ORDER — SODIUM CHLORIDE 9 MG/ML
75 INJECTION, SOLUTION INTRAVENOUS CONTINUOUS
Status: DISCONTINUED | OUTPATIENT
Start: 2021-10-25 | End: 2021-10-25 | Stop reason: HOSPADM

## 2021-10-25 RX ORDER — SODIUM CHLORIDE 0.9 % (FLUSH) 0.9 %
10 SYRINGE (ML) INJECTION AS NEEDED
Status: DISCONTINUED | OUTPATIENT
Start: 2021-10-25 | End: 2021-10-25 | Stop reason: HOSPADM

## 2021-10-25 RX ORDER — SODIUM CHLORIDE 0.9 % (FLUSH) 0.9 %
3 SYRINGE (ML) INJECTION EVERY 12 HOURS SCHEDULED
Status: DISCONTINUED | OUTPATIENT
Start: 2021-10-25 | End: 2021-10-25 | Stop reason: HOSPADM

## 2021-10-25 RX ORDER — CLOPIDOGREL BISULFATE 75 MG/1
75 TABLET ORAL DAILY
Status: DISCONTINUED | OUTPATIENT
Start: 2021-10-26 | End: 2021-10-25 | Stop reason: HOSPADM

## 2021-10-25 RX ORDER — RANOLAZINE 500 MG/1
500 TABLET, EXTENDED RELEASE ORAL 2 TIMES DAILY
Qty: 180 TABLET | Refills: 12 | Status: SHIPPED | OUTPATIENT
Start: 2021-10-25

## 2021-10-25 RX ORDER — OXYCODONE AND ACETAMINOPHEN 10; 325 MG/1; MG/1
1 TABLET ORAL ONCE AS NEEDED
Status: COMPLETED | OUTPATIENT
Start: 2021-10-25 | End: 2021-10-25

## 2021-10-25 RX ORDER — MIDAZOLAM HYDROCHLORIDE 1 MG/ML
INJECTION INTRAMUSCULAR; INTRAVENOUS AS NEEDED
Status: DISCONTINUED | OUTPATIENT
Start: 2021-10-25 | End: 2021-10-25 | Stop reason: HOSPADM

## 2021-10-25 RX ORDER — LIDOCAINE HYDROCHLORIDE 20 MG/ML
INJECTION, SOLUTION INFILTRATION; PERINEURAL AS NEEDED
Status: DISCONTINUED | OUTPATIENT
Start: 2021-10-25 | End: 2021-10-25 | Stop reason: HOSPADM

## 2021-10-25 RX ORDER — ONDANSETRON 2 MG/ML
INJECTION INTRAMUSCULAR; INTRAVENOUS AS NEEDED
Status: DISCONTINUED | OUTPATIENT
Start: 2021-10-25 | End: 2021-10-25 | Stop reason: HOSPADM

## 2021-10-25 RX ORDER — SODIUM CHLORIDE 9 MG/ML
100 INJECTION, SOLUTION INTRAVENOUS CONTINUOUS
Status: DISCONTINUED | OUTPATIENT
Start: 2021-10-25 | End: 2021-10-25 | Stop reason: HOSPADM

## 2021-10-25 RX ORDER — FENTANYL CITRATE 50 UG/ML
INJECTION, SOLUTION INTRAMUSCULAR; INTRAVENOUS AS NEEDED
Status: DISCONTINUED | OUTPATIENT
Start: 2021-10-25 | End: 2021-10-25 | Stop reason: HOSPADM

## 2021-10-25 RX ORDER — ACETAMINOPHEN 325 MG/1
650 TABLET ORAL EVERY 4 HOURS PRN
Status: DISCONTINUED | OUTPATIENT
Start: 2021-10-25 | End: 2021-10-25 | Stop reason: HOSPADM

## 2021-10-25 RX ADMIN — SODIUM CHLORIDE 75 ML/HR: 9 INJECTION, SOLUTION INTRAVENOUS at 08:37

## 2021-10-25 RX ADMIN — OXYCODONE HYDROCHLORIDE AND ACETAMINOPHEN 1 TABLET: 10; 325 TABLET ORAL at 11:47

## 2021-10-25 RX ADMIN — HYDROCORTISONE SODIUM SUCCINATE 100 MG: 100 INJECTION, POWDER, FOR SOLUTION INTRAMUSCULAR; INTRAVENOUS at 10:32

## 2021-10-29 ENCOUNTER — DOCUMENTATION (OUTPATIENT)
Dept: CARDIAC REHAB | Facility: HOSPITAL | Age: 66
End: 2021-10-29

## 2025-03-07 NOTE — THERAPY TREATMENT NOTE
Outpatient Physical Therapy Ortho Treatment Note  [facility]     Patient Name: Jeramy South  : 1955  MRN: [id number]  Today's Date: 2019      Visit Date: 2019     Subjective Improvement: 50%  Attendance:  13/15 (medicare)  Next MD Visit : 19  Recert Date:  19        Therapy Diagnosis:  S/P R RTC Repair 19  \      Visit Dx:    ICD-10-CM ICD-9-CM   1. Nontraumatic incomplete tear of right rotator cuff M75.111 726.13   2. Status post right rotator cuff repair Z98.890 V45.89       There is no problem list on file for this patient.       No past medical history on file.     No past surgical history on file.    PT Ortho     Row Name 19 0755       Precautions and Contraindications    Precautions/Limitations  fall precautions  -    Precautions  s/P R RTC Repair 19 L ankle ORIF 2018  -       Posture/Observations    Posture/Observations Comments  No sling; fwd flexed at trunk   -    Row Name 19 1016       Precautions and Contraindications    Precautions/Limitations  fall precautions  -    Precautions  s/P R RTC Repair 19 L ankle ORIF 2018  -    Contraindications  9 weeks s/p  -       Posture/Observations    Posture/Observations Comments  No sling; fwd flexed at trunk   -       General ROM    GENERAL ROM COMMENTS  standing AROM Flex 160; abd 162; ER at side 70; IR behind back T12  -      User Key  (r) = Recorded By, (t) = Taken By, (c) = Cosigned By    Initials Name Provider Type    Dariana Cedeño PTA Physical Therapy Assistant                      PT Assessment/Plan     Row Name 19 0755 19 1016       PT Assessment    Assessment Comments  added tband to HEP this date; did well with theraband no issues and able to go through full ROM in all planes without pain  -  met all but one goal at this time. Progressing well with strength and ROM in the right shoulder and functional activities.   -       PT Plan    PT Frequency   2x/week  -KH  2x/week  -KH    Predicted Duration of Therapy Intervention (Therapy Eval)  8 weeks  -KH  8 weeks  -KH    PT Plan Comments  Continue through next week with possible d/c at that time to independent program and fitness membership  -KH  Continue to progress through next week then d/c to independent program unless otherwise advised.   -KH      User Key  (r) = Recorded By, (t) = Taken By, (c) = Cosigned By    Initials Name Provider Type    Dariana Cedeño PTA Physical Therapy Assistant          Modalities     Row Name 09/16/19 1016             Subjective Pain    Post-Treatment Pain Level  1  -KH        User Key  (r) = Recorded By, (t) = Taken By, (c) = Cosigned By    Initials Name Provider Type    Dariana Cedeño PTA Physical Therapy Assistant        OP Exercises     Row Name 09/17/19 0755 09/16/19 1016          Subjective Comments    Subjective Comments  Pt reports that he is doing well. no pain after yesterday's treatment. No pain at the present time.   -KH  Pt reports that he is doing well; No new issues with shoulder  -KH        Subjective Pain    Able to rate subjective pain?  yes  -KH  yes  -KH     Pre-Treatment Pain Level  0  -KH  2  -KH     Post-Treatment Pain Level  0  -KH  1  -KH        Exercise 1    Exercise Name 1  pro ll UE strength  -KH  pro ll UE strength  -KH     Cueing 1  Verbal  -KH  Verbal  -KH     Time 1  12'  -KH  10'  -KH     Additional Comments  level 3.5; F/B  -KH  level 3.5  -KH        Exercise 2    Exercise Name 2  pulley's flexion/abd  -KH  seated shoulder 3 way PRE  -KH     Cueing 2  Verbal  -KH  Verbal  -KH     Sets 2  2  -KH  2  -KH     Reps 2  10  -KH  10  -KH     Additional Comments  --  1# db  -KH        Exercise 3    Exercise Name 3  wall push ups  -KH  standing PNF   -KH     Cueing 3  Verbal  -KH  Verbal  -KH     Sets 3  2  -KH  2  -KH     Reps 3  10  -KH  10  -KH     Additional Comments  --  1# db  -KH        Exercise 4    Exercise Name 4  tband 6 way  -KH  cybex chest  154 press  -KH     Cueing 4  Verbal  -KH  Verbal  -KH     Sets 4  2  -KH  2  -KH     Reps 4  10  -KH  10  -KH     Additional Comments  green (HEP)  -KH  15#  -KH        Exercise 5    Exercise Name 5  tband rows High & Mid  -KH  cybex incline pull  -KH     Cueing 5  Verbal  -KH  Verbal  -KH     Sets 5  3  -KH  3  -KH     Reps 5  10  -KH  10  -KH     Additional Comments  green (HEP)  -KH  50#  -KH        Exercise 6    Exercise Name 6  --  cybex row  -KH     Cueing 6  --  Verbal  -KH     Sets 6  --  3  -KH     Reps 6  --  10  -KH     Additional Comments  --  50#  -KH        Exercise 7    Exercise Name 7  --  checked strength and ROM   -KH       User Key  (r) = Recorded By, (t) = Taken By, (c) = Cosigned By    Initials Name Provider Type    Dariana Cedeño, JOSE Physical Therapy Assistant                       PT OP Goals     Row Name 09/17/19 0755 09/16/19 1016       PT Short Term Goals    STG Date to Achieve  08/14/19  -KH  08/14/19  -    STG 1  Patient independent in home exercise program  -KH  Patient independent in home exercise program  -    STG 1 Progress  Ongoing;Progressing  -KH  Ongoing;Progressing  -KH    STG 2   Patient have passive shoulder flexion 165 degrees  -KH   Patient have passive shoulder flexion 165 degrees  -KH    STG 2 Progress  Met  -KH  Met  -KH    STG 3  Patient have shoulder passive range of motion abduction 160 degrees  -KH  Patient have shoulder passive range of motion abduction 160 degrees  -KH    STG 3 Progress  Met  -KH  Met  -KH    STG 4  Patient have external rotation at 45 degrees abduction to 55 degrees or better  -KH  Patient have external rotation at 45 degrees abduction to 55 degrees or better  -    STG 4 Progress  Met  -KH  Met  -KH    STG 5  Patient able to progress to active assistive program at 4 weeks  -KH  Patient able to progress to active assistive program at 4 weeks  -    STG 5 Progress  Met  -KH  Met  -KH    STG 6  Patient had a quick-score of 70% or less  -KH  Patient  had a quick-score of 70% or less  -KH    STG 6 Progress  Met  -KH  Met  -KH    STG 6 Progress Comments  25%  -KH  25%  -KH       Long Term Goals    LTG 1  Patient have a quick-score of less than 30%  -KH  Patient have a quick-score of less than 30%  -KH    LTG 1 Progress  Met  -KH  Met  -KH    LTG 2  Patient had manual muscle testing 5/5 shoulder flexion  -KH  Patient had manual muscle testing 5/5 shoulder flexion  -KH    LTG 2 Progress  Progressing  -KH  Progressing  -KH    LTG 2 Progress Comments  --  4+/5  -KH    LTG 3  Patient have shoulder abduction 4+ MMT  -KH  Patient have shoulder abduction 4+ MMT  -KH    LTG 3 Progress  Met  -KH  Met  -KH    LTG 4  Patient have active range of motion 160 degrees flexion  -KH  Patient have active range of motion 160 degrees flexion  -KH    LTG 4 Progress  Met  -KH  Met  -KH    LTG 5  Patient have active shoulder flexion 160 degrees  -KH  Patient have active shoulder flexion 160 degrees  -KH    LTG 5 Progress  Met  -KH  Met  -KH    LTG 6  Patient have active internal rotation reach equal to the left  -KH  Patient have active internal rotation reach equal to the left  -KH    LTG 6 Progress  Met  -KH  Met  -KH    LTG 7  Patient have active external rotation at 90 degrees abduction to 80 degrees or better  -KH  Patient have active external rotation at 90 degrees abduction to 80 degrees or better  -KH    LTG 7 Progress  Met  -KH  Met  -KH    LTG 7 Progress Comments  --  85 this date  -       Time Calculation    PT Goal Re-Cert Due Date  09/25/19  -KH  09/25/19  -      User Key  (r) = Recorded By, (t) = Taken By, (c) = Cosigned By    Initials Name Provider Type    Dariana Cedeño PTA Physical Therapy Assistant                         Time Calculation:   Start Time: 0755  Stop Time: 0835  Time Calculation (min): 40 min  Total Timed Code Minutes- PT: 40 minute(s)  Therapy Charges for Today     Code Description Service Date Service Provider Modifiers Qty    [id number]  PT THER  PROC EA 15 MIN 9/17/2019 Dariana Shi, PTA GP 3                    Dariana Shi, PTA  9/17/2019

## (undated) DEVICE — CATH DIAG EXPO M/ PK 6FR FL4/FR4 PIG 3PK

## (undated) DEVICE — ELECTRODE,RT,STRESS,FOAM,50PK: Brand: MEDLINE

## (undated) DEVICE — KT INTRO MINISTICK MAX W/GW NITNL/TUNG ECHO 4F 21G 7CM

## (undated) DEVICE — PK CATH LAB 60

## (undated) DEVICE — GW PERIPH GUIDERIGHT STD/J/TP PTFE/PCOAT SS 0.038IN 5X150CM

## (undated) DEVICE — MODEL AT P65, P/N 701554-001KIT CONTENTS: HAND CONTROLLER, 3-WAY HIGH-PRESSURE STOPCOCK WITH ROTATING END AND PREMIUM HIGH-PRESSURE TUBING: Brand: ANGIOTOUCH® KIT

## (undated) DEVICE — MODEL BT2000 P/N 700287-012KIT CONTENTS: MANIFOLD WITH SALINE AND CONTRAST PORTS, SALINE TUBING WITH SPIKE AND HAND SYRINGE, TRANSDUCER: Brand: BT2000 AUTOMATED MANIFOLD KIT

## (undated) DEVICE — INTRO SHEATH ART/FEM ENGAGE .038 6F12CM

## (undated) DEVICE — ANGIO-SEAL VIP VASCULAR CLOSURE DEVICE: Brand: ANGIO-SEAL

## (undated) DEVICE — SNAP KOVER: Brand: UNBRANDED